# Patient Record
Sex: FEMALE | Race: WHITE | NOT HISPANIC OR LATINO | Employment: FULL TIME | URBAN - METROPOLITAN AREA
[De-identification: names, ages, dates, MRNs, and addresses within clinical notes are randomized per-mention and may not be internally consistent; named-entity substitution may affect disease eponyms.]

---

## 2023-06-14 ASSESSMENT — PROMIS GLOBAL HEALTH SCALE
CARRYOUT_SOCIAL_ACTIVITIES: EXCELLENT
CARRYOUT_PHYSICAL_ACTIVITIES: COMPLETELY
RATE_PHYSICAL_HEALTH: VERY GOOD
RATE_GENERAL_HEALTH: EXCELLENT
RATE_SOCIAL_SATISFACTION: EXCELLENT
RATE_MENTAL_HEALTH: VERY GOOD
RATE_QUALITY_OF_LIFE: EXCELLENT
RATE_AVERAGE_PAIN: 0
EMOTIONAL_PROBLEMS: SOMETIMES

## 2023-06-15 ENCOUNTER — OFFICE VISIT (OUTPATIENT)
Dept: PRIMARY CARE | Facility: CLINIC | Age: 28
End: 2023-06-15
Payer: COMMERCIAL

## 2023-06-15 VITALS
SYSTOLIC BLOOD PRESSURE: 128 MMHG | WEIGHT: 154 LBS | HEART RATE: 133 BPM | HEIGHT: 66 IN | BODY MASS INDEX: 24.75 KG/M2 | DIASTOLIC BLOOD PRESSURE: 84 MMHG

## 2023-06-15 DIAGNOSIS — Z23 NEED FOR TDAP VACCINATION: ICD-10-CM

## 2023-06-15 DIAGNOSIS — E04.1 THYROID NODULE: ICD-10-CM

## 2023-06-15 DIAGNOSIS — Z00.00 ENCOUNTER FOR PREVENTIVE HEALTH EXAMINATION: Primary | ICD-10-CM

## 2023-06-15 DIAGNOSIS — S43.431S GLENOID LABRAL TEAR, RIGHT, SEQUELA: ICD-10-CM

## 2023-06-15 PROBLEM — J30.1 ALLERGIC RHINITIS DUE TO POLLEN: Status: ACTIVE | Noted: 2023-06-15

## 2023-06-15 PROBLEM — L50.3 DERMATOGRAPHISM: Status: ACTIVE | Noted: 2023-06-15

## 2023-06-15 PROBLEM — H10.45 CHRONIC ALLERGIC CONJUNCTIVITIS: Status: ACTIVE | Noted: 2023-06-15

## 2023-06-15 PROBLEM — F41.9 ANXIETY: Status: ACTIVE | Noted: 2023-06-15

## 2023-06-15 PROBLEM — K58.9 IRRITABLE BOWEL SYNDROME (IBS): Status: ACTIVE | Noted: 2023-06-15

## 2023-06-15 PROCEDURE — 1036F TOBACCO NON-USER: CPT | Performed by: STUDENT IN AN ORGANIZED HEALTH CARE EDUCATION/TRAINING PROGRAM

## 2023-06-15 PROCEDURE — 99395 PREV VISIT EST AGE 18-39: CPT | Performed by: STUDENT IN AN ORGANIZED HEALTH CARE EDUCATION/TRAINING PROGRAM

## 2023-06-15 PROCEDURE — 99213 OFFICE O/P EST LOW 20 MIN: CPT | Performed by: STUDENT IN AN ORGANIZED HEALTH CARE EDUCATION/TRAINING PROGRAM

## 2023-06-15 PROCEDURE — 90715 TDAP VACCINE 7 YRS/> IM: CPT | Performed by: STUDENT IN AN ORGANIZED HEALTH CARE EDUCATION/TRAINING PROGRAM

## 2023-06-15 PROCEDURE — 90471 IMMUNIZATION ADMIN: CPT | Performed by: STUDENT IN AN ORGANIZED HEALTH CARE EDUCATION/TRAINING PROGRAM

## 2023-06-15 RX ORDER — CETIRIZINE HYDROCHLORIDE 10 MG/1
10 TABLET ORAL
COMMUNITY

## 2023-06-15 RX ORDER — LEVONORGESTREL AND ETHINYL ESTRADIOL 0.1-0.02MG
1 KIT ORAL
COMMUNITY
Start: 2020-10-06

## 2023-06-15 ASSESSMENT — PATIENT HEALTH QUESTIONNAIRE - PHQ9
2. FEELING DOWN, DEPRESSED OR HOPELESS: NOT AT ALL
1. LITTLE INTEREST OR PLEASURE IN DOING THINGS: NOT AT ALL
SUM OF ALL RESPONSES TO PHQ9 QUESTIONS 1 AND 2: 0

## 2023-06-15 NOTE — PROGRESS NOTES
Subjective   Patient ID: Melissa Brady is a 28 y.o. female who presents for Annual Exam.    HPI  Diet is well balanced.  Working on adding regular exercise to their routine.  PAP is up to date.  Vaccines are not up to date; they are due for: Tdap  Dental exam is up to date.  Vision exam is up to date.  Patient is not fasting for labs today.     Other concerns addressed today include: shoulder and thyroid  Patient would like an MRI ordered of her right shoulder.  She does have a history of a glenoid labral tear and she believes she may have torn it again in the last 6 months.  She has had swelling in the axilla area and she has had an orthopedic surgery referral for some time, but she is just been putting it off.  She really would like an MRI, but the previous provider she still would not order it for her.  She does have hypermobility of that shoulder and she believes she also had a rotator cuff injury in the past.  She reports no numbness, tingling or weakness of the right upper extremity.    She would like to get her thyroid function checked.  She was diagnosed with possible Hashimoto's a while back, but her thyroid function has been normal.  She would like to get this checked regularly and she would like to have that checked now.  She is getting  in October and will be stopping her birth control to try to start a family right away.    Review of Systems  All pertinent positive symptoms are included in the history of present illness.    All other systems have been reviewed and are negative and noncontributory to this patient's current ailments.     Social History     Tobacco Use   • Smoking status: Never   • Smokeless tobacco: Never   Substance Use Topics   • Alcohol use: Not on file      Past Surgical History:   Procedure Laterality Date   • SHOULDER SURGERY  12/29/2017    Shoulder Surgery      No Known Allergies     Current Outpatient Medications   Medication Sig Dispense Refill   • Larissia 0.1-20  "mg-mcg tablet Take 1 tablet by mouth once daily.     • cetirizine (ZyrTEC) 10 mg tablet Take 1 tablet (10 mg) by mouth once daily.       No current facility-administered medications for this visit.        Patient Active Problem List   Diagnosis   • Thyroid nodule   • Irritable bowel syndrome (IBS)   • Chronic allergic conjunctivitis   • Allergic rhinitis due to pollen   • Dermatographism   • Anxiety      Immunization History   Administered Date(s) Administered   • Moderna SARS-CoV-2 Vaccination 01/14/2021, 02/11/2021       Objective   VITALS  /84   Pulse (!) 133   Ht 1.676 m (5' 6\")   Wt 69.9 kg (154 lb)   BMI 24.86 kg/m²      Physical Exam  CONSTITUTIONAL - well nourished, well developed, looks like stated age, in no acute distress, not ill-appearing, and not tired appearing  SKIN - normal skin color and pigmentation, normal skin turgor without rash, lesions, or nodules visualized  HEAD - no trauma, normocephalic  EYES - pupils are equal and reactive to light, extraocular muscles are intact, and normal external exam  ENT - TM's intact, no injection, no signs of infection, uvula midline, normal tongue movement and throat normal, no exudate, nasal passage without discharge and patent  NECK - supple without rigidity, no neck mass was observed, no thyromegaly or thyroid nodules  CHEST - clear to auscultation, no wheezing, no crackles and no rales, good effort  CARDIAC - regular rate and regular rhythm, no skipped beats, no murmur  ABDOMEN - no organomegaly, soft, nontender, nondistended, normal bowel sounds, no guarding/rebound/rigidity, negative McBurney sign and negative Morelos sign  EXTREMITIES - no edema, no deformities  NEUROLOGICAL - normal gait, normal balance, normal motor, no ataxia, DTRs equal and symmetrical; alert, oriented and no focal signs  PSYCHIATRIC - alert, pleasant and cordial, age-appropriate  IMMUNOLOGIC - no cervical lymphadenopathy     Assessment/Plan   Diagnoses and all orders for " this visit:  Encounter for preventive health examination  A complete history and physical was performed today.  PAP is up to date.  Fasting labs ordered today include:  -     Hemoglobin A1C; Future  -     Lipid Panel; Future  -     Comprehensive Metabolic Panel; Future  -     CBC; Future  -     Hepatitis C Antibody; Future  Need for Tdap vaccination  -     Tdap vaccine, age 10 years and older  (BOOSTRIX)  Thyroid nodule  -     TSH with reflex to Free T4 if abnormal; Future  Glenoid labral tear, right, sequela  -     MR shoulder right wo IV contrast; Future

## 2023-07-12 ENCOUNTER — LAB (OUTPATIENT)
Dept: LAB | Facility: LAB | Age: 28
End: 2023-07-12
Payer: COMMERCIAL

## 2023-07-12 DIAGNOSIS — E04.1 THYROID NODULE: ICD-10-CM

## 2023-07-12 DIAGNOSIS — Z00.00 ENCOUNTER FOR PREVENTIVE HEALTH EXAMINATION: ICD-10-CM

## 2023-07-12 LAB
ALANINE AMINOTRANSFERASE (SGPT) (U/L) IN SER/PLAS: 26 U/L (ref 7–45)
ALBUMIN (G/DL) IN SER/PLAS: 4.3 G/DL (ref 3.4–5)
ALKALINE PHOSPHATASE (U/L) IN SER/PLAS: 56 U/L (ref 33–110)
ANION GAP IN SER/PLAS: 13 MMOL/L (ref 10–20)
ASPARTATE AMINOTRANSFERASE (SGOT) (U/L) IN SER/PLAS: 29 U/L (ref 9–39)
BILIRUBIN TOTAL (MG/DL) IN SER/PLAS: 0.6 MG/DL (ref 0–1.2)
CALCIUM (MG/DL) IN SER/PLAS: 9.2 MG/DL (ref 8.6–10.3)
CARBON DIOXIDE, TOTAL (MMOL/L) IN SER/PLAS: 28 MMOL/L (ref 21–32)
CHLORIDE (MMOL/L) IN SER/PLAS: 101 MMOL/L (ref 98–107)
CHOLESTEROL (MG/DL) IN SER/PLAS: 179 MG/DL (ref 0–199)
CHOLESTEROL IN HDL (MG/DL) IN SER/PLAS: 60.9 MG/DL
CHOLESTEROL/HDL RATIO: 2.9
CREATININE (MG/DL) IN SER/PLAS: 0.91 MG/DL (ref 0.5–1.05)
ERYTHROCYTE DISTRIBUTION WIDTH (RATIO) BY AUTOMATED COUNT: 13.8 % (ref 11.5–14.5)
ERYTHROCYTE MEAN CORPUSCULAR HEMOGLOBIN CONCENTRATION (G/DL) BY AUTOMATED: 31.6 G/DL (ref 32–36)
ERYTHROCYTE MEAN CORPUSCULAR VOLUME (FL) BY AUTOMATED COUNT: 87 FL (ref 80–100)
ERYTHROCYTES (10*6/UL) IN BLOOD BY AUTOMATED COUNT: 4.93 X10E12/L (ref 4–5.2)
ESTIMATED AVERAGE GLUCOSE FOR HBA1C: 103 MG/DL
GFR FEMALE: 88 ML/MIN/1.73M2
GLUCOSE (MG/DL) IN SER/PLAS: 79 MG/DL (ref 74–99)
HEMATOCRIT (%) IN BLOOD BY AUTOMATED COUNT: 43 % (ref 36–46)
HEMOGLOBIN (G/DL) IN BLOOD: 13.6 G/DL (ref 12–16)
HEMOGLOBIN A1C/HEMOGLOBIN TOTAL IN BLOOD: 5.2 %
HEPATITIS C VIRUS AB PRESENCE IN SERUM: NONREACTIVE
LDL: 93 MG/DL (ref 0–99)
LEUKOCYTES (10*3/UL) IN BLOOD BY AUTOMATED COUNT: 11.2 X10E9/L (ref 4.4–11.3)
PLATELETS (10*3/UL) IN BLOOD AUTOMATED COUNT: 394 X10E9/L (ref 150–450)
POTASSIUM (MMOL/L) IN SER/PLAS: 4 MMOL/L (ref 3.5–5.3)
PROTEIN TOTAL: 7.4 G/DL (ref 6.4–8.2)
SODIUM (MMOL/L) IN SER/PLAS: 138 MMOL/L (ref 136–145)
THYROTROPIN (MIU/L) IN SER/PLAS BY DETECTION LIMIT <= 0.05 MIU/L: 1.4 MIU/L (ref 0.44–3.98)
TRIGLYCERIDE (MG/DL) IN SER/PLAS: 126 MG/DL (ref 0–149)
UREA NITROGEN (MG/DL) IN SER/PLAS: 12 MG/DL (ref 6–23)
VLDL: 25 MG/DL (ref 0–40)

## 2023-07-12 PROCEDURE — 85027 COMPLETE CBC AUTOMATED: CPT

## 2023-07-12 PROCEDURE — 80053 COMPREHEN METABOLIC PANEL: CPT

## 2023-07-12 PROCEDURE — 36415 COLL VENOUS BLD VENIPUNCTURE: CPT

## 2023-07-12 PROCEDURE — 80061 LIPID PANEL: CPT

## 2023-07-12 PROCEDURE — 84443 ASSAY THYROID STIM HORMONE: CPT

## 2023-07-12 PROCEDURE — 86803 HEPATITIS C AB TEST: CPT

## 2023-07-12 PROCEDURE — 83036 HEMOGLOBIN GLYCOSYLATED A1C: CPT

## 2023-08-23 LAB — SARS-COV-2 RESULT: DETECTED

## 2023-09-11 ENCOUNTER — LAB (OUTPATIENT)
Dept: LAB | Facility: LAB | Age: 28
End: 2023-09-11
Payer: COMMERCIAL

## 2023-09-11 LAB — TOBACCO SCREEN, URINE: NEGATIVE

## 2023-09-26 PROBLEM — M25.511 PAIN IN JOINT OF RIGHT SHOULDER: Status: ACTIVE | Noted: 2023-09-26

## 2023-09-26 PROBLEM — S92.812A CLOSED FRACTURE OF SESAMOID BONE OF LEFT FOOT: Status: ACTIVE | Noted: 2017-09-29

## 2023-09-26 PROBLEM — M62.81 MUSCLE WEAKNESS OF RIGHT UPPER EXTREMITY: Status: ACTIVE | Noted: 2023-09-26

## 2023-09-26 PROBLEM — R22.1 LOCALIZED SWELLING, MASS AND LUMP, NECK: Status: ACTIVE | Noted: 2019-08-19

## 2023-09-26 PROBLEM — T50.Z95D ADVERSE EFFECT OF OTHER VACCINES AND BIOLOGICAL SUBSTANCES, SUBSEQUENT ENCOUNTER: Status: ACTIVE | Noted: 2023-09-26

## 2023-09-26 PROBLEM — R00.0 TACHYCARDIA: Status: ACTIVE | Noted: 2023-09-26

## 2023-09-26 PROBLEM — M79.672 PAIN IN LEFT FOOT: Status: ACTIVE | Noted: 2017-10-02

## 2023-09-26 RX ORDER — EPINEPHRINE 0.3 MG/.3ML
0.3 INJECTION SUBCUTANEOUS
COMMUNITY
Start: 2021-04-23

## 2023-09-26 RX ORDER — LORATADINE 10 MG/1
10 TABLET ORAL DAILY
COMMUNITY

## 2023-09-26 RX ORDER — AZELAIC ACID 0.15 G/G
GEL TOPICAL
COMMUNITY
Start: 2023-07-25

## 2023-10-02 ENCOUNTER — CLINICAL SUPPORT (OUTPATIENT)
Dept: ALLERGY | Facility: CLINIC | Age: 28
End: 2023-10-02
Payer: COMMERCIAL

## 2023-10-02 DIAGNOSIS — J30.89 NON-SEASONAL ALLERGIC RHINITIS, UNSPECIFIED TRIGGER: ICD-10-CM

## 2023-10-02 DIAGNOSIS — J30.81 ALLERGIC RHINITIS DUE TO ANIMAL HAIR AND DANDER: ICD-10-CM

## 2023-10-02 DIAGNOSIS — Z91.09 OTHER ALLERGY, OTHER THAN TO MEDICINAL AGENTS: ICD-10-CM

## 2023-10-02 DIAGNOSIS — J30.9 ALLERGIC RHINITIS, UNSPECIFIED SEASONALITY, UNSPECIFIED TRIGGER: ICD-10-CM

## 2023-10-02 PROCEDURE — 95117 IMMUNOTHERAPY INJECTIONS: CPT | Performed by: ALLERGY & IMMUNOLOGY

## 2023-10-09 ENCOUNTER — CLINICAL SUPPORT (OUTPATIENT)
Dept: ALLERGY | Facility: CLINIC | Age: 28
End: 2023-10-09
Payer: COMMERCIAL

## 2023-10-09 DIAGNOSIS — J30.89 NON-SEASONAL ALLERGIC RHINITIS, UNSPECIFIED TRIGGER: ICD-10-CM

## 2023-10-09 DIAGNOSIS — J30.9 ALLERGIC RHINITIS, UNSPECIFIED SEASONALITY, UNSPECIFIED TRIGGER: ICD-10-CM

## 2023-10-09 DIAGNOSIS — Z91.09 OTHER ALLERGY, OTHER THAN TO MEDICINAL AGENTS: ICD-10-CM

## 2023-10-09 DIAGNOSIS — J30.81 ALLERGIC RHINITIS DUE TO ANIMAL (CAT) (DOG) HAIR AND DANDER: ICD-10-CM

## 2023-10-09 PROCEDURE — 95117 IMMUNOTHERAPY INJECTIONS: CPT | Performed by: ALLERGY & IMMUNOLOGY

## 2023-10-16 DIAGNOSIS — T75.3XXD MOTION SICKNESS, SUBSEQUENT ENCOUNTER: Primary | ICD-10-CM

## 2023-10-16 RX ORDER — SCOLOPAMINE TRANSDERMAL SYSTEM 1 MG/1
1 PATCH, EXTENDED RELEASE TRANSDERMAL
Qty: 10 PATCH | Refills: 0 | Status: SHIPPED
Start: 2023-10-16 | End: 2023-12-06 | Stop reason: WASHOUT

## 2023-11-06 ENCOUNTER — CLINICAL SUPPORT (OUTPATIENT)
Dept: ALLERGY | Facility: CLINIC | Age: 28
End: 2023-11-06
Payer: COMMERCIAL

## 2023-11-06 DIAGNOSIS — J30.89 NON-SEASONAL ALLERGIC RHINITIS, UNSPECIFIED TRIGGER: ICD-10-CM

## 2023-11-06 DIAGNOSIS — J30.81 ALLERGIC RHINITIS DUE TO ANIMAL (CAT) (DOG) HAIR AND DANDER: ICD-10-CM

## 2023-11-06 DIAGNOSIS — J30.9 ALLERGIC RHINITIS, UNSPECIFIED SEASONALITY, UNSPECIFIED TRIGGER: ICD-10-CM

## 2023-11-06 PROCEDURE — 95117 IMMUNOTHERAPY INJECTIONS: CPT | Performed by: ALLERGY & IMMUNOLOGY

## 2023-11-13 ENCOUNTER — CLINICAL SUPPORT (OUTPATIENT)
Dept: ALLERGY | Facility: CLINIC | Age: 28
End: 2023-11-13
Payer: COMMERCIAL

## 2023-11-13 DIAGNOSIS — J30.89 NON-SEASONAL ALLERGIC RHINITIS, UNSPECIFIED TRIGGER: ICD-10-CM

## 2023-11-13 DIAGNOSIS — J30.81 ALLERGIC RHINITIS DUE TO ANIMAL (CAT) (DOG) HAIR AND DANDER: ICD-10-CM

## 2023-11-13 DIAGNOSIS — J30.9 ALLERGIC RHINITIS, UNSPECIFIED SEASONALITY, UNSPECIFIED TRIGGER: ICD-10-CM

## 2023-11-13 PROCEDURE — 95117 IMMUNOTHERAPY INJECTIONS: CPT | Performed by: ALLERGY & IMMUNOLOGY

## 2023-11-20 ENCOUNTER — CLINICAL SUPPORT (OUTPATIENT)
Dept: ALLERGY | Facility: CLINIC | Age: 28
End: 2023-11-20
Payer: COMMERCIAL

## 2023-11-20 DIAGNOSIS — J30.9 ALLERGIC RHINITIS, UNSPECIFIED SEASONALITY, UNSPECIFIED TRIGGER: ICD-10-CM

## 2023-11-20 DIAGNOSIS — J30.81 ALLERGIC RHINITIS DUE TO ANIMAL (CAT) (DOG) HAIR AND DANDER: ICD-10-CM

## 2023-11-20 DIAGNOSIS — J30.89 NON-SEASONAL ALLERGIC RHINITIS, UNSPECIFIED TRIGGER: ICD-10-CM

## 2023-11-20 PROCEDURE — 95117 IMMUNOTHERAPY INJECTIONS: CPT | Performed by: ALLERGY & IMMUNOLOGY

## 2023-11-27 ENCOUNTER — CLINICAL SUPPORT (OUTPATIENT)
Dept: ALLERGY | Facility: CLINIC | Age: 28
End: 2023-11-27
Payer: COMMERCIAL

## 2023-11-27 DIAGNOSIS — J30.81 ALLERGIC RHINITIS DUE TO ANIMAL (CAT) (DOG) HAIR AND DANDER: ICD-10-CM

## 2023-11-27 DIAGNOSIS — J30.9 ALLERGIC RHINITIS, UNSPECIFIED SEASONALITY, UNSPECIFIED TRIGGER: ICD-10-CM

## 2023-11-27 DIAGNOSIS — J30.89 NON-SEASONAL ALLERGIC RHINITIS, UNSPECIFIED TRIGGER: ICD-10-CM

## 2023-11-27 PROCEDURE — 95117 IMMUNOTHERAPY INJECTIONS: CPT | Performed by: ALLERGY & IMMUNOLOGY

## 2023-12-04 ENCOUNTER — CLINICAL SUPPORT (OUTPATIENT)
Dept: ALLERGY | Facility: CLINIC | Age: 28
End: 2023-12-04
Payer: COMMERCIAL

## 2023-12-04 DIAGNOSIS — J30.81 ALLERGIC RHINITIS DUE TO ANIMAL (CAT) (DOG) HAIR AND DANDER: ICD-10-CM

## 2023-12-04 DIAGNOSIS — J30.9 ALLERGIC RHINITIS, UNSPECIFIED SEASONALITY, UNSPECIFIED TRIGGER: ICD-10-CM

## 2023-12-04 DIAGNOSIS — J30.89 NON-SEASONAL ALLERGIC RHINITIS, UNSPECIFIED TRIGGER: ICD-10-CM

## 2023-12-04 PROCEDURE — 95117 IMMUNOTHERAPY INJECTIONS: CPT | Performed by: ALLERGY & IMMUNOLOGY

## 2023-12-06 ENCOUNTER — OFFICE VISIT (OUTPATIENT)
Dept: PRIMARY CARE | Facility: CLINIC | Age: 28
End: 2023-12-06
Payer: COMMERCIAL

## 2023-12-06 ENCOUNTER — LAB (OUTPATIENT)
Dept: LAB | Facility: LAB | Age: 28
End: 2023-12-06
Payer: COMMERCIAL

## 2023-12-06 VITALS
DIASTOLIC BLOOD PRESSURE: 84 MMHG | HEIGHT: 66 IN | WEIGHT: 151 LBS | SYSTOLIC BLOOD PRESSURE: 134 MMHG | HEART RATE: 145 BPM | BODY MASS INDEX: 24.27 KG/M2

## 2023-12-06 DIAGNOSIS — R00.0 TACHYCARDIA: ICD-10-CM

## 2023-12-06 DIAGNOSIS — H61.21 IMPACTED CERUMEN OF RIGHT EAR: ICD-10-CM

## 2023-12-06 DIAGNOSIS — F41.9 ANXIETY: Primary | ICD-10-CM

## 2023-12-06 LAB — TSH SERPL-ACNC: 0.92 MIU/L (ref 0.44–3.98)

## 2023-12-06 PROCEDURE — 84443 ASSAY THYROID STIM HORMONE: CPT

## 2023-12-06 PROCEDURE — 36415 COLL VENOUS BLD VENIPUNCTURE: CPT

## 2023-12-06 PROCEDURE — 99214 OFFICE O/P EST MOD 30 MIN: CPT | Performed by: STUDENT IN AN ORGANIZED HEALTH CARE EDUCATION/TRAINING PROGRAM

## 2023-12-06 PROCEDURE — 1036F TOBACCO NON-USER: CPT | Performed by: STUDENT IN AN ORGANIZED HEALTH CARE EDUCATION/TRAINING PROGRAM

## 2023-12-06 ASSESSMENT — PATIENT HEALTH QUESTIONNAIRE - PHQ9
SUM OF ALL RESPONSES TO PHQ9 QUESTIONS 1 AND 2: 0
1. LITTLE INTEREST OR PLEASURE IN DOING THINGS: NOT AT ALL
2. FEELING DOWN, DEPRESSED OR HOPELESS: NOT AT ALL

## 2023-12-06 NOTE — PROGRESS NOTES
"Subjective   Patient ID: Melissa Brady is a 28 y.o. female who presents for Panic Attack.  HPI  She is here for anxiety and panic attack which happens in the past 6 weeks. She experiences lightheaded, chest tightness, sweat during the attack. She stated an online therapy which she finds very helpful and convint with the work hours.  She is not interested in medication. Her heart was check with heart monitor and they clear her for heart disease. Her HR is normal high and would get to 130   She was diagnosed with Hashimoto disease before but did not have treatment for that.   She also is complaining of ear fullness without fever, running nose, ear pain, or discharge.   PMH allergies for which she gets injection monthly and the symptoms of allergies are controlled.   She works in the rehabilitation program at [employer]    Denies new onset headaches, chills, n/v/d, chest pain, SOB, abdominal pain, urinary symptoms, and lower extremity edema.     Review of Systems  All other systems have been reviewed and are negative.    Visit Vitals  /84   Pulse (!) 145   Ht 1.676 m (5' 6\")   Wt 68.5 kg (151 lb)   BMI 24.37 kg/m²   Smoking Status Never   BSA 1.79 m²       Objective   Physical Exam  General: Alert and oriented. Appears well-nourished and in no acute distress.  Eyes: PERRLA. EOMI.  Ear; clear external canal bilateral, unable to see TM due to wax  Head/neck: Normocephalic. Supple.  Lymphatics: No cervical lymphadenopathy.  Respiratory/Thorax: Clear to auscultation bilaterally. No wheezing.   Cardiovascular: Regular rate and rhythm. No murmurs.  Gastrointestinal: Soft, nontender, nondistended. +BS   Musculoskeletal: ROM intact. No joint swelling. Normal strength   Extremities: Warm and well perfused. No peripheral edema.  Neurological: No gross neurologic deficits.   Psychological: Appropriate mood and affect. Pleasant to talk, slightly anxious.   Skin: No visible rashes or lesions.     Assessment/Plan   She " is 28 year old female who complain of panic attack    # panic attack/tachycardia  -started 6 weeks ago (she got  at that time)  -exclude thyroid by ordering TSH with reflex T4  -last TSH was in July and was normal  -currently she is doing online therapy and she finds that helpful  -not interested in medication    # ear wax  -fullness of the right ear  -unable to see TM due to wax  -patient was advised to use Debrox for 5 days and come back for wax removal if she is unsuccessful at home  -No signs or symptoms of infection       No red flags. Follow up in  1 week for discussing TSH and wax impaction    Problem List Items Addressed This Visit       Anxiety - Primary    Tachycardia    Relevant Orders    TSH with reflex to Free T4 if abnormal     Other Visit Diagnoses       Impacted cerumen of right ear                I have personally reviewed all available pertinent labs, imaging, and consult notes with the patient.     All questions and concerns were addressed. Patient verbalizes understanding instructions and agrees with established plan of care.     I discussed the plan with Dr. Norris Pringle MD  Family medicine resident  PGY2

## 2023-12-11 PROCEDURE — 87624 HPV HI-RISK TYP POOLED RSLT: CPT

## 2023-12-11 PROCEDURE — 88175 CYTOPATH C/V AUTO FLUID REDO: CPT

## 2023-12-13 ENCOUNTER — LAB REQUISITION (OUTPATIENT)
Dept: LAB | Facility: HOSPITAL | Age: 28
End: 2023-12-13
Payer: COMMERCIAL

## 2023-12-13 DIAGNOSIS — R87.610 ATYPICAL SQUAMOUS CELLS OF UNDETERMINED SIGNIFICANCE ON CYTOLOGIC SMEAR OF CERVIX (ASC-US): ICD-10-CM

## 2023-12-13 DIAGNOSIS — R87.810 CERVICAL HIGH RISK HUMAN PAPILLOMAVIRUS (HPV) DNA TEST POSITIVE: ICD-10-CM

## 2023-12-13 DIAGNOSIS — Z87.42 PERSONAL HISTORY OF OTHER DISEASES OF THE FEMALE GENITAL TRACT: ICD-10-CM

## 2023-12-13 DIAGNOSIS — Z11.51 ENCOUNTER FOR SCREENING FOR HUMAN PAPILLOMAVIRUS (HPV): ICD-10-CM

## 2023-12-13 DIAGNOSIS — Z12.4 ENCOUNTER FOR SCREENING FOR MALIGNANT NEOPLASM OF CERVIX: ICD-10-CM

## 2023-12-15 ENCOUNTER — DOCUMENTATION (OUTPATIENT)
Dept: PHYSICAL THERAPY | Facility: CLINIC | Age: 28
End: 2023-12-15
Payer: COMMERCIAL

## 2023-12-15 NOTE — PROGRESS NOTES
Physical Therapy    Discharge Summary    Name: Melissa Brady  MRN: [id number]  : 1995  Date: 12/15/23    Discharge Summary: PT    Discharge Information: Date of discharge 12/15/2023, Date of last visit 2022, Date of evaluation 2022, Number of attended visits 2, Referred by Alejandra CAMPBELL, and Referred for acute pain of R shoulder    Therapy Summary: Pt attended PT to address acute right shoulder pain and fullness/swelling inferior angle region of right scapular, right scapular dyskinesia with shoulder and scapular instability, faulty postural alignment, impaired right SH rhythm with RUE motion, weakness in R shoulder and scapular stabilizers.    Discharge Status: Goals not met, unable to assess     Rehab Discharge Reason: Failed to schedule and/or keep follow-up appointment(s)

## 2023-12-18 ENCOUNTER — CLINICAL SUPPORT (OUTPATIENT)
Dept: ALLERGY | Facility: CLINIC | Age: 28
End: 2023-12-18
Payer: COMMERCIAL

## 2023-12-18 DIAGNOSIS — J30.81 ALLERGIC RHINITIS DUE TO ANIMAL (CAT) (DOG) HAIR AND DANDER: ICD-10-CM

## 2023-12-18 DIAGNOSIS — J30.89 NON-SEASONAL ALLERGIC RHINITIS, UNSPECIFIED TRIGGER: ICD-10-CM

## 2023-12-18 DIAGNOSIS — J30.9 ALLERGIC RHINITIS, UNSPECIFIED SEASONALITY, UNSPECIFIED TRIGGER: ICD-10-CM

## 2023-12-18 PROCEDURE — 95117 IMMUNOTHERAPY INJECTIONS: CPT | Performed by: ALLERGY & IMMUNOLOGY

## 2023-12-29 LAB
CYTOLOGY CMNT CVX/VAG CYTO-IMP: NORMAL
HPV HR 12 DNA GENITAL QL NAA+PROBE: POSITIVE
HPV HR GENOTYPES PNL CVX NAA+PROBE: POSITIVE
HPV16 DNA SPEC QL NAA+PROBE: NEGATIVE
HPV18 DNA SPEC QL NAA+PROBE: NEGATIVE
LAB AP HPV GENOTYPE QUESTION: YES
LAB AP HPV HR: NORMAL
LAB AP PREVIOUS ABNORMAL HISTORY: NORMAL
LABORATORY COMMENT REPORT: NORMAL
LMP START DATE: NORMAL
PATH REPORT.TOTAL CANCER: NORMAL

## 2024-01-08 ENCOUNTER — LAB REQUISITION (OUTPATIENT)
Dept: LAB | Facility: HOSPITAL | Age: 29
End: 2024-01-08
Payer: COMMERCIAL

## 2024-01-08 ENCOUNTER — CLINICAL SUPPORT (OUTPATIENT)
Dept: ALLERGY | Facility: CLINIC | Age: 29
End: 2024-01-08
Payer: COMMERCIAL

## 2024-01-08 DIAGNOSIS — J30.89 NON-SEASONAL ALLERGIC RHINITIS, UNSPECIFIED TRIGGER: ICD-10-CM

## 2024-01-08 DIAGNOSIS — J30.81 ALLERGIC RHINITIS DUE TO ANIMAL (CAT) (DOG) HAIR AND DANDER: ICD-10-CM

## 2024-01-08 DIAGNOSIS — R30.0 DYSURIA: ICD-10-CM

## 2024-01-08 DIAGNOSIS — J30.1 ALLERGIC RHINITIS DUE TO POLLEN, UNSPECIFIED SEASONALITY: ICD-10-CM

## 2024-01-08 PROCEDURE — 87086 URINE CULTURE/COLONY COUNT: CPT

## 2024-01-08 PROCEDURE — 95117 IMMUNOTHERAPY INJECTIONS: CPT | Performed by: ALLERGY & IMMUNOLOGY

## 2024-01-10 LAB — BACTERIA UR CULT: NO GROWTH

## 2024-01-15 ENCOUNTER — CLINICAL SUPPORT (OUTPATIENT)
Dept: ALLERGY | Facility: CLINIC | Age: 29
End: 2024-01-15
Payer: COMMERCIAL

## 2024-01-15 DIAGNOSIS — J30.89 NON-SEASONAL ALLERGIC RHINITIS, UNSPECIFIED TRIGGER: ICD-10-CM

## 2024-01-15 DIAGNOSIS — J30.1 ALLERGIC RHINITIS DUE TO POLLEN, UNSPECIFIED SEASONALITY: ICD-10-CM

## 2024-01-15 DIAGNOSIS — J30.81 ALLERGIC RHINITIS DUE TO ANIMAL (CAT) (DOG) HAIR AND DANDER: ICD-10-CM

## 2024-01-15 PROCEDURE — 95117 IMMUNOTHERAPY INJECTIONS: CPT | Performed by: ALLERGY & IMMUNOLOGY

## 2024-01-29 ENCOUNTER — CLINICAL SUPPORT (OUTPATIENT)
Dept: ALLERGY | Facility: CLINIC | Age: 29
End: 2024-01-29
Payer: COMMERCIAL

## 2024-01-29 DIAGNOSIS — J30.1 ALLERGIC RHINITIS DUE TO POLLEN, UNSPECIFIED SEASONALITY: ICD-10-CM

## 2024-01-29 DIAGNOSIS — J30.81 ALLERGIC RHINITIS DUE TO ANIMAL (CAT) (DOG) HAIR AND DANDER: ICD-10-CM

## 2024-01-29 DIAGNOSIS — J30.89 NON-SEASONAL ALLERGIC RHINITIS, UNSPECIFIED TRIGGER: ICD-10-CM

## 2024-01-29 PROCEDURE — 95117 IMMUNOTHERAPY INJECTIONS: CPT | Performed by: ALLERGY & IMMUNOLOGY

## 2024-02-08 ENCOUNTER — APPOINTMENT (OUTPATIENT)
Dept: PRIMARY CARE | Facility: CLINIC | Age: 29
End: 2024-02-08
Payer: COMMERCIAL

## 2024-02-19 ENCOUNTER — CLINICAL SUPPORT (OUTPATIENT)
Dept: ALLERGY | Facility: CLINIC | Age: 29
End: 2024-02-19
Payer: COMMERCIAL

## 2024-02-19 DIAGNOSIS — J30.89 NON-SEASONAL ALLERGIC RHINITIS, UNSPECIFIED TRIGGER: ICD-10-CM

## 2024-02-19 DIAGNOSIS — J30.1 ALLERGIC RHINITIS DUE TO POLLEN, UNSPECIFIED SEASONALITY: ICD-10-CM

## 2024-02-19 DIAGNOSIS — J30.81 ALLERGIC RHINITIS DUE TO ANIMAL (CAT) (DOG) HAIR AND DANDER: ICD-10-CM

## 2024-02-19 PROCEDURE — 95117 IMMUNOTHERAPY INJECTIONS: CPT | Performed by: ALLERGY & IMMUNOLOGY

## 2024-02-21 ENCOUNTER — LAB (OUTPATIENT)
Dept: LAB | Facility: LAB | Age: 29
End: 2024-02-21
Payer: COMMERCIAL

## 2024-02-21 DIAGNOSIS — N91.2 AMENORRHEA, UNSPECIFIED: Primary | ICD-10-CM

## 2024-02-21 LAB
B-HCG SERPL-ACNC: 62 MIU/ML
TSH SERPL-ACNC: 0.94 MIU/L (ref 0.44–3.98)

## 2024-02-21 PROCEDURE — 36415 COLL VENOUS BLD VENIPUNCTURE: CPT

## 2024-02-21 PROCEDURE — 84702 CHORIONIC GONADOTROPIN TEST: CPT

## 2024-02-21 PROCEDURE — 84443 ASSAY THYROID STIM HORMONE: CPT

## 2024-03-11 ENCOUNTER — APPOINTMENT (OUTPATIENT)
Dept: PRIMARY CARE | Facility: CLINIC | Age: 29
End: 2024-03-11
Payer: COMMERCIAL

## 2024-03-18 ENCOUNTER — APPOINTMENT (OUTPATIENT)
Dept: ALLERGY | Facility: CLINIC | Age: 29
End: 2024-03-18
Payer: COMMERCIAL

## 2024-03-20 ENCOUNTER — LAB REQUISITION (OUTPATIENT)
Dept: LAB | Facility: HOSPITAL | Age: 29
End: 2024-03-20
Payer: COMMERCIAL

## 2024-03-20 DIAGNOSIS — Z34.01 ENCOUNTER FOR SUPERVISION OF NORMAL FIRST PREGNANCY, FIRST TRIMESTER (HHS-HCC): ICD-10-CM

## 2024-03-20 DIAGNOSIS — Z11.3 ENCOUNTER FOR SCREENING FOR INFECTIONS WITH A PREDOMINANTLY SEXUAL MODE OF TRANSMISSION: ICD-10-CM

## 2024-03-20 PROCEDURE — 87591 N.GONORRHOEAE DNA AMP PROB: CPT

## 2024-03-20 PROCEDURE — 87800 DETECT AGNT MULT DNA DIREC: CPT

## 2024-03-20 PROCEDURE — 87491 CHLMYD TRACH DNA AMP PROBE: CPT

## 2024-03-21 LAB
C TRACH RRNA SPEC QL NAA+PROBE: NEGATIVE
N GONORRHOEA DNA SPEC QL PROBE+SIG AMP: NEGATIVE

## 2024-03-25 ENCOUNTER — OFFICE VISIT (OUTPATIENT)
Dept: ALLERGY | Facility: CLINIC | Age: 29
End: 2024-03-25
Payer: COMMERCIAL

## 2024-03-25 DIAGNOSIS — J30.89 ALLERGIC RHINITIS DUE TO OTHER ALLERGIC TRIGGER, UNSPECIFIED SEASONALITY: Primary | ICD-10-CM

## 2024-03-25 PROCEDURE — 95117 IMMUNOTHERAPY INJECTIONS: CPT | Performed by: ALLERGY & IMMUNOLOGY

## 2024-04-01 ENCOUNTER — PROCEDURE VISIT (OUTPATIENT)
Dept: PRIMARY CARE | Facility: CLINIC | Age: 29
End: 2024-04-01
Payer: COMMERCIAL

## 2024-04-01 ENCOUNTER — OFFICE VISIT (OUTPATIENT)
Dept: OTOLARYNGOLOGY | Facility: CLINIC | Age: 29
End: 2024-04-01
Payer: COMMERCIAL

## 2024-04-01 ENCOUNTER — CLINICAL SUPPORT (OUTPATIENT)
Dept: AUDIOLOGY | Facility: CLINIC | Age: 29
End: 2024-04-01
Payer: COMMERCIAL

## 2024-04-01 VITALS
BODY MASS INDEX: 24.91 KG/M2 | OXYGEN SATURATION: 97 % | HEART RATE: 71 BPM | WEIGHT: 155 LBS | TEMPERATURE: 97.7 F | HEIGHT: 66 IN | SYSTOLIC BLOOD PRESSURE: 130 MMHG | DIASTOLIC BLOOD PRESSURE: 80 MMHG

## 2024-04-01 VITALS — TEMPERATURE: 97.7 F | WEIGHT: 153 LBS | HEIGHT: 66 IN | BODY MASS INDEX: 24.59 KG/M2

## 2024-04-01 DIAGNOSIS — H90.3 SENSORINEURAL HEARING LOSS (SNHL) OF BOTH EARS: ICD-10-CM

## 2024-04-01 DIAGNOSIS — J30.9 ALLERGIC RHINITIS, UNSPECIFIED SEASONALITY, UNSPECIFIED TRIGGER: ICD-10-CM

## 2024-04-01 DIAGNOSIS — H90.3 SENSORINEURAL HEARING LOSS (SNHL) OF BOTH EARS: Primary | ICD-10-CM

## 2024-04-01 DIAGNOSIS — Z00.00 ROUTINE GENERAL MEDICAL EXAMINATION AT A HEALTH CARE FACILITY: Primary | ICD-10-CM

## 2024-04-01 DIAGNOSIS — H69.93 DYSFUNCTION OF BOTH EUSTACHIAN TUBES: Primary | ICD-10-CM

## 2024-04-01 DIAGNOSIS — Z3A.10 10 WEEKS GESTATION OF PREGNANCY (HHS-HCC): ICD-10-CM

## 2024-04-01 PROBLEM — T50.905A ADVERSE EFFECT OF BIOLOGICAL SUBSTANCE: Status: ACTIVE | Noted: 2023-09-26

## 2024-04-01 PROBLEM — N89.8 VAGINAL DISCHARGE: Status: ACTIVE | Noted: 2024-04-01

## 2024-04-01 PROBLEM — M25.519 ARTHRALGIA OF SHOULDER: Status: ACTIVE | Noted: 2023-09-26

## 2024-04-01 PROBLEM — M62.81 MUSCLE WEAKNESS OF UPPER EXTREMITY: Status: ACTIVE | Noted: 2022-12-20

## 2024-04-01 PROCEDURE — 92557 COMPREHENSIVE HEARING TEST: CPT | Performed by: AUDIOLOGIST

## 2024-04-01 PROCEDURE — 1036F TOBACCO NON-USER: CPT | Performed by: OTOLARYNGOLOGY

## 2024-04-01 PROCEDURE — 99395 PREV VISIT EST AGE 18-39: CPT | Performed by: FAMILY MEDICINE

## 2024-04-01 PROCEDURE — 99203 OFFICE O/P NEW LOW 30 MIN: CPT | Performed by: OTOLARYNGOLOGY

## 2024-04-01 PROCEDURE — 92550 TYMPANOMETRY & REFLEX THRESH: CPT | Performed by: AUDIOLOGIST

## 2024-04-01 RX ORDER — HYDROCORTISONE 25 MG/G
OINTMENT TOPICAL
COMMUNITY
Start: 2021-05-12

## 2024-04-01 RX ORDER — DOXYCYCLINE 100 MG/1
CAPSULE ORAL
COMMUNITY
Start: 2022-10-23

## 2024-04-01 RX ORDER — CLINDAMYCIN PHOSPHATE 10 MG/ML
SOLUTION TOPICAL
COMMUNITY
Start: 2022-03-02

## 2024-04-01 RX ORDER — TRIAMCINOLONE ACETONIDE 1 MG/G
CREAM TOPICAL
COMMUNITY
Start: 2022-03-01

## 2024-04-01 ASSESSMENT — ENCOUNTER SYMPTOMS
DIARRHEA: 0
UNEXPECTED WEIGHT CHANGE: 0
BLOOD IN STOOL: 0
EYE PAIN: 0
WEAKNESS: 0
SORE THROAT: 0
FEVER: 0
DECREASED CONCENTRATION: 0
CONFUSION: 0
SHORTNESS OF BREATH: 0
DIZZINESS: 0
FATIGUE: 0
PALPITATIONS: 0
HEMATURIA: 0
DYSURIA: 0
NUMBNESS: 0
VOMITING: 0
COUGH: 0
ABDOMINAL PAIN: 0
HALLUCINATIONS: 0
JOINT SWELLING: 0
NAUSEA: 0
FREQUENCY: 0
HEADACHES: 0
TROUBLE SWALLOWING: 0

## 2024-04-01 ASSESSMENT — PAIN SCALES - GENERAL: PAINLEVEL: 0-NO PAIN

## 2024-04-01 ASSESSMENT — PATIENT HEALTH QUESTIONNAIRE - PHQ9
1. LITTLE INTEREST OR PLEASURE IN DOING THINGS: NOT AT ALL
2. FEELING DOWN, DEPRESSED OR HOPELESS: NOT AT ALL
SUM OF ALL RESPONSES TO PHQ9 QUESTIONS 1 AND 2: 0

## 2024-04-01 NOTE — PROGRESS NOTES
Chief Complaint   Patient presents with    New Patient Visit     NP LEFT EAR POPPING, CLICKING FOR 7 YEARS, PRESSURE DURING ALLERGY SEASON, 10 WKS PREGNANT      Date of Evaluation: 4/1/2024   HPI  Melissa Garcia is a 29 y.o. female 10 weeks pregnant who complains of frequent popping in the left ear.  This has been a longstanding problem.  She does have allergy and is on immunotherapy and has used a variety of antihistamines.  She has stopped everything because of the pregnancy.  She has a history of noise exposure hunting with her father on a limited number of occasions.  There has been gun noise exposure on these limited occasions without ear protection.  Her audiogram today shows bilateral high-frequency sensorineural hearing loss with normal tympanometry.       Past Medical History:   Diagnosis Date    Other fracture of left foot, sequela 12/29/2017    Closed fracture of sesamoid bone of left foot, sequela    Other prurigo 06/04/2020    Pruritic rash    Other specified health status     Known health problems: none    Other specified joint disorders, unspecified joint 12/29/2017    Sesamoiditis    Pain in left foot 12/29/2017    Foot pain, left    Personal history of other diseases of the female genital tract 06/04/2021    History of vaginal discharge      Past Surgical History:   Procedure Laterality Date    SHOULDER SURGERY  12/29/2017    Shoulder Surgery          Medications:   Current Outpatient Medications   Medication Instructions    azelaic acid (Finacea) 15 % gel Topical    cetirizine (ZYRTEC) 10 mg, oral, Daily RT    clindamycin (Cleocin T) 1 % swab Clindamycin Phosphate 1 % External Swab Quantity: 60 Refills: 0 Ordered: 2-Mar-2022 DO Start : 2-Mar-2022 Active    doxycycline (Vibramycin) 100 mg capsule oral    EPINEPHrine 0.3 mg/0.3 mL injection syringe 0.3 mL, intramuscular, AS DIRECTED.    hydrocortisone 2.5 % ointment Hydrocortisone 2.5 % External Ointment APPLY AS DIRECTED TWICE A DAY EXTERNALLY  "TO FACE Quantity: 60 Refills: 0 Ordered: 12-May-2021 DO Start : 12-May-2021 Active    Larissia 0.1-20 mg-mcg tablet 1 tablet, oral, Daily RT    loratadine (CLARITIN) 10 mg, oral, Daily    multivitamin (MULTIPLE VITAMINS ORAL) oral    PNV Comb12/Iron Cb/FA1/DSS/DHA (PRENATAL 12-IRON-FA1-DSS-DHA ORAL) oral    triamcinolone (Kenalog) 0.1 % cream Triamcinolone Acetonide 0.1 % External Cream Quantity: 30 Refills: 0 Ordered: 1-Mar-2022 DO Start : 1-Mar-2022 Active        Allergies:  Allergies   Allergen Reactions    Adhesive Other     \"Intolerance    Latex Itching        Physical Exam:  Last Recorded Vitals  Temperature 36.5 °C (97.7 °F), height 1.676 m (5' 6\"), weight 69.4 kg (153 lb).  []General appearance: Well-developed, well-nourished in no acute distress, conversant with normal voice quality    Head/face: No erythema or edema or facial tenderness, and normal facial nerve function bilaterally    External ear: Clear external auditory canals with normal pinnae  Tube status: N/A  Middle ear: Tympanic membranes intact and mobile, middle ears normal.  Easily able to lateralize tympanic membrane with auto insufflation  Tympanic membrane perforation: N/A  Mastoid bowl: N/A  Hearing: Normal conversational awareness at normal speech thresholds    Nose visualized using: Anterior rhinoscopy  Nasal dorsum: Nontraumatic midline appearance  Septum: Midline, nonobstructing  Inferior turbinates: Normal, pink  Secretions: Dry    Oral cavity and oropharynx: Normal  Teeth: Good condition  Floor of mouth: without lesions  Palate: Normal hard palate, soft palate and uvula  Oropharynx: Clear, no lesions present  Buccal mucosa: Normal without masses or lesions  Lips: Normal    Nasopharynx: Inadequate mirror exam secondary to gag/anatomy    Neck:  Salivary glands: Normal bilateral parotid and submandibular glands by inspection and palpation.  Non-thyroid masses: No palpable masses or significant lymphadenopathy  Trachea: Midline  Thyroid: " No thyromegaly or palpable nodules  Temporomandibular joint: Nontender  Cervical range of motion: Normal    Neurologic exam: Alert and oriented x3, appropriate affect.  Cranial nerves II-XII normal bilaterally  Extraocular movement: Extraocular movement intact, normal gaze alignment        Melissa was seen today for new patient visit.  Diagnoses and all orders for this visit:  Dysfunction of both eustachian tubes (Primary)  Allergic rhinitis, unspecified seasonality, unspecified trigger  Sensorineural hearing loss (SNHL) of both ears       PLAN  I believe she has some mild intermittent eustachian tube dysfunction.  This is made worse with pregnancy hormones.  Unfortunately not much treatment at this time.  We did discuss noise exposure and I have recommended earplugs with any future noise exposure.  Repeat audiogram in 1 to 2 years.  Flonase when safe with her obstetrician    Sung Sky MD

## 2024-04-01 NOTE — PATIENT INSTRUCTIONS
It was nice to see you today!  Discussed current concerns and addressed   Reviewed recent labs and diagnostics  Reviewed medications list  Continue to eat a healthy diet, exercise at least 3 times a week or more  Plan and follow up discussed  For any further information related to your condition, copy and paste or go to [email or web address]  Follow up with OB

## 2024-04-08 ENCOUNTER — LAB (OUTPATIENT)
Dept: LAB | Facility: LAB | Age: 29
End: 2024-04-08
Payer: COMMERCIAL

## 2024-04-08 ENCOUNTER — APPOINTMENT (OUTPATIENT)
Dept: PRIMARY CARE | Facility: CLINIC | Age: 29
End: 2024-04-08
Payer: COMMERCIAL

## 2024-04-08 DIAGNOSIS — Z34.01 ENCOUNTER FOR SUPERVISION OF NORMAL FIRST PREGNANCY, FIRST TRIMESTER (HHS-HCC): Primary | ICD-10-CM

## 2024-04-08 LAB
ABO GROUP (TYPE) IN BLOOD: NORMAL
ANTIBODY SCREEN: NORMAL
ERYTHROCYTE [DISTWIDTH] IN BLOOD BY AUTOMATED COUNT: 13.6 % (ref 11.5–14.5)
EST. AVERAGE GLUCOSE BLD GHB EST-MCNC: 94 MG/DL
HBA1C MFR BLD: 4.9 %
HBV SURFACE AG SERPL QL IA: NONREACTIVE
HCT VFR BLD AUTO: 42.6 % (ref 36–46)
HCV AB SER QL: NONREACTIVE
HGB BLD-MCNC: 13.6 G/DL (ref 12–16)
HIV 1+2 AB+HIV1 P24 AG SERPL QL IA: NONREACTIVE
MCH RBC QN AUTO: 28.1 PG (ref 26–34)
MCHC RBC AUTO-ENTMCNC: 31.9 G/DL (ref 32–36)
MCV RBC AUTO: 88 FL (ref 80–100)
NRBC BLD-RTO: 0 /100 WBCS (ref 0–0)
PLATELET # BLD AUTO: 356 X10*3/UL (ref 150–450)
RBC # BLD AUTO: 4.84 X10*6/UL (ref 4–5.2)
RH FACTOR (ANTIGEN D): NORMAL
WBC # BLD AUTO: 11.8 X10*3/UL (ref 4.4–11.3)

## 2024-04-08 PROCEDURE — 86900 BLOOD TYPING SEROLOGIC ABO: CPT

## 2024-04-08 PROCEDURE — 86317 IMMUNOASSAY INFECTIOUS AGENT: CPT

## 2024-04-08 PROCEDURE — 86850 RBC ANTIBODY SCREEN: CPT

## 2024-04-08 PROCEDURE — 87340 HEPATITIS B SURFACE AG IA: CPT

## 2024-04-08 PROCEDURE — 86780 TREPONEMA PALLIDUM: CPT

## 2024-04-08 PROCEDURE — 36415 COLL VENOUS BLD VENIPUNCTURE: CPT

## 2024-04-08 PROCEDURE — 86901 BLOOD TYPING SEROLOGIC RH(D): CPT

## 2024-04-08 PROCEDURE — 85027 COMPLETE CBC AUTOMATED: CPT

## 2024-04-08 PROCEDURE — 87389 HIV-1 AG W/HIV-1&-2 AB AG IA: CPT

## 2024-04-08 PROCEDURE — 83036 HEMOGLOBIN GLYCOSYLATED A1C: CPT

## 2024-04-08 PROCEDURE — 86803 HEPATITIS C AB TEST: CPT

## 2024-04-08 PROCEDURE — 87086 URINE CULTURE/COLONY COUNT: CPT

## 2024-04-09 LAB
BACTERIA UR CULT: NORMAL
RUBV IGG SERPL IA-ACNC: 0.6 IA
RUBV IGG SERPL QL IA: NEGATIVE
TREPONEMA PALLIDUM IGG+IGM AB [PRESENCE] IN SERUM OR PLASMA BY IMMUNOASSAY: NONREACTIVE

## 2024-04-10 NOTE — PROGRESS NOTES
"  AUDIOLOGY ADULT AUDIOMETRIC EVALUATION    Name:  Melissa Garcia  :  1995  Age:  29 y.o.  Date of Evaluation:  2024    Reason for visit: Melissa is seen in the clinic today at the request of otolaryngology for an audiologic evaluation.     HISTORY  Patient reports she has history of allergies; eustachian tube dysfunction of left ear.   She has not been taking her allergy medications with being 10 weeks pregnant.   She has intermittent ringing; no dizziness.  There is history of noise exposure; hunting.    She does not notice any significant hearing loss.      EVALUATION  See scanned audiogram: “Media” > “Audiology Report”.      RESULTS  Otoscopic Evaluation:  Right Ear: clear ear canal  Left Ear: clear ear canal    Immittance Measures:  Tympanometry:  Right Ear: Type A, normal tympanic membrane mobility with normal middle ear pressure  Left Ear: Type A, normal tympanic membrane mobility with normal middle ear pressure    Acoustic Reflexes:  Ipsilateral Right Ear: Acoustic reflexes present within normal limits 500Hz through 4KHz   Ipsilateral Left Ear: Acoustic reflexes present within normal limits 500Hz through 4KHz   Contralateral Right Ear: did not evaluate  Contralateral Left Ear: did not evaluate    Audiometry:  Test Technique and Reliability:   Standard audiometry via supra-aural headphones; insert phones for higher frequencies.   Reliability is good.    Pure tone air and bone conduction audiometry:  Right Ear: Hearing sensitivity within normal limits 250Hz through 4KHz; mild sensorineural hearing loss at 6KHz through 8KHz  Left Ear: Borderline normal notch at 2KHz rising to normal at 3KHz-4KHz; mild to moderate sensorineural hearing loss 6KHz-8KHz     Speech Audiometry (Word Recognition Scores):   Right Ear: Excellent   Left Ear: Excellent     IMPRESSIONS    Mild high frequency sensorineural hearing loss 6KHz and above bilaterally; borderline normal \"notch\" at 2KHz left " ear.    RECOMMENDATIONS  - Follow up with otolaryngology today as scheduled.  - Annual audiologic evaluation, sooner if an acute change is noted.    PATIENT EDUCATION  Discussed results, impressions and recommendations with the patient. Questions were addressed and the patient was encouraged to contact our office should concerns arise.    Time for this encounter: 400/440    La Farias M.A., CCC/A   Licensed Audiologist

## 2024-04-18 LAB — SCAN RESULT: NORMAL

## 2024-04-23 LAB — SCAN RESULT: NORMAL

## 2024-04-29 ENCOUNTER — OFFICE VISIT (OUTPATIENT)
Dept: ALLERGY | Facility: CLINIC | Age: 29
End: 2024-04-29
Payer: COMMERCIAL

## 2024-04-29 DIAGNOSIS — J30.89 ALLERGIC RHINITIS DUE TO OTHER ALLERGIC TRIGGER, UNSPECIFIED SEASONALITY: Primary | ICD-10-CM

## 2024-04-29 PROCEDURE — 95117 IMMUNOTHERAPY INJECTIONS: CPT | Performed by: ALLERGY & IMMUNOLOGY

## 2024-05-11 NOTE — PROGRESS NOTES
Subjective   Patient ID:   [id number]   Melissa Garcia is a 29 y.o. female who presents for No chief complaint on file..    No chief complaint on file.         HPI  This patient is here to evaluate for:      Review of Systems      Objective     There were no vitals taken for this visit.     Physical Exam       Current Outpatient Medications   Medication Sig Dispense Refill    azelaic acid (Finacea) 15 % gel Apply topically.      cetirizine (ZyrTEC) 10 mg tablet Take 1 tablet (10 mg) by mouth once daily.      clindamycin (Cleocin T) 1 % swab Clindamycin Phosphate 1 % External Swab Quantity: 60 Refills: 0 Ordered: 2-Mar-2022 DO Start : 2-Mar-2022 Active      doxycycline (Vibramycin) 100 mg capsule Take by mouth.      EPINEPHrine 0.3 mg/0.3 mL injection syringe Inject 0.3 mL (0.3 mg) into the muscle. AS DIRECTED.      hydrocortisone 2.5 % ointment Hydrocortisone 2.5 % External Ointment APPLY AS DIRECTED TWICE A DAY EXTERNALLY TO FACE Quantity: 60 Refills: 0 Ordered: 12-May-2021 DO Start : 12-May-2021 Active      Larissia 0.1-20 mg-mcg tablet Take 1 tablet by mouth once daily.      loratadine (Claritin) 10 mg tablet Take 1 tablet (10 mg) by mouth once daily.      multivitamin (MULTIPLE VITAMINS ORAL) Take by mouth.      PNV Comb12/Iron Cb/FA1/DSS/DHA (PRENATAL 12-IRON-FA1-DSS-DHA ORAL) Take by mouth.      triamcinolone (Kenalog) 0.1 % cream Triamcinolone Acetonide 0.1 % External Cream Quantity: 30 Refills: 0 Ordered: 1-Mar-2022 DO Start : 1-Mar-2022 Active       No current facility-administered medications for this visit.       Summary of the labs over the past 6 months:    Lab Requisition on 03/20/2024   Component Date Value Ref Range Status    Neisseria gonorrhea,Amplified 03/20/2024 Negative  Negative Final    Chlamydia trachomatis, Amplified 03/20/2024 Negative  Negative Final   Lab on 02/21/2024   Component Date Value Ref Range Status    HCG, Beta-Quantitative 02/21/2024 62 (H)  <5 mIU/mL Final    Thyroid  Stimulating Hormone 02/21/2024 0.94  0.44 - 3.98 mIU/L Final   Lab Requisition on 01/08/2024   Component Date Value Ref Range Status    Urine Culture 01/08/2024 No growth   Final   Lab Requisition on 12/11/2023   Component Date Value Ref Range Status    Case Report 12/11/2023    Final                    Value:Gynecologic Cytology                              Case: [id number]                                   Authorizing Provider:  Natalya Rodrigues MD    Collected:           12/11/2023 0843              Ordering Location:     [facility]       Received:            12/13/2023 1315                                     Center                                                                       First Screen:          [place], [place]                                                                Rescreen:              KRISTIN Flores                                                              Specimen:    ThinPrep Liquid-Based Pap-Imaging System Screen, ECTO/ENDOCERVIX/VAGINA, SCREENING         Final Cytological Interpretation 12/11/2023    Final                    Value:This result contains rich text formatting which cannot be displayed here.      12/11/2023    Final                    Value:This result contains rich text formatting which cannot be displayed here.    ThinPrep Imaging System 12/11/2023    Final                    Value:This result contains rich text formatting which cannot be displayed here.    Educational Note 12/11/2023    Final                    Value:This result contains rich text formatting which cannot be displayed here.    Perform HPV HR test? 12/11/2023 Always (all interpretations)   Final    Include HPV Genotype? 12/11/2023 Yes   Final    LMP 12/11/2023 11/29/2023   Final    Previous abnormal cytology/biopsy 12/11/2023    Final                    Value:This result contains rich text formatting which cannot be displayed here.    HPV, high-risk 12/11/2023 Positive (A)  Negative Final     HPV Type 16 DNA 12/11/2023 Negative  Negative Final    HPV Type 18 DNA 12/11/2023 Negative  Negative Final    HPV non-Type 16 or 18 DNA 12/11/2023 Positive (A)  Negative Final   Lab on 12/06/2023   Component Date Value Ref Range Status    Thyroid Stimulating Hormone 12/06/2023 0.92  0.44 - 3.98 mIU/L Final         Assessment/Plan           Jose Saba MD

## 2024-05-28 NOTE — PROGRESS NOTES
See Allergy Shot Immunotherapy Record Book  Allergy checklist done, no concerns                  Subjective   Patient ID:   [id number]   Melissa Garcia is a 29 y.o. female who presents for No chief complaint on file..    No chief complaint on file.         HPI  This patient is here to evaluate for:      Review of Systems      Objective     LMP 01/25/2024      Physical Exam       Current Outpatient Medications   Medication Sig Dispense Refill    azelaic acid (Finacea) 15 % gel Apply topically.      cetirizine (ZyrTEC) 10 mg tablet Take 1 tablet (10 mg) by mouth once daily.      clindamycin (Cleocin T) 1 % swab Clindamycin Phosphate 1 % External Swab Quantity: 60 Refills: 0 Ordered: 2-Mar-2022 DO Start : 2-Mar-2022 Active      doxycycline (Vibramycin) 100 mg capsule Take by mouth.      EPINEPHrine 0.3 mg/0.3 mL injection syringe Inject 0.3 mL (0.3 mg) into the muscle. AS DIRECTED.      hydrocortisone 2.5 % ointment Hydrocortisone 2.5 % External Ointment APPLY AS DIRECTED TWICE A DAY EXTERNALLY TO FACE Quantity: 60 Refills: 0 Ordered: 12-May-2021 DO Start : 12-May-2021 Active      Larissia 0.1-20 mg-mcg tablet Take 1 tablet by mouth once daily.      loratadine (Claritin) 10 mg tablet Take 1 tablet (10 mg) by mouth once daily.      multivitamin (MULTIPLE VITAMINS ORAL) Take by mouth.      PNV Comb12/Iron Cb/FA1/DSS/DHA (PRENATAL 12-IRON-FA1-DSS-DHA ORAL) Take by mouth.      triamcinolone (Kenalog) 0.1 % cream Triamcinolone Acetonide 0.1 % External Cream Quantity: 30 Refills: 0 Ordered: 1-Mar-2022 DO Start : 1-Mar-2022 Active       No current facility-administered medications for this visit.       Summary of the labs over the past 6 months:    Lab on 04/08/2024   Component Date Value Ref Range Status    Scan Result 04/08/2024 Sent to Ordering Provider   Final    Scan Result 04/08/2024 Sent to Ordering Provider   Final    HIV 1/2 Antigen/Antibody Screen wi* 04/08/2024 Nonreactive  Nonreactive Final    WBC 04/08/2024  11.8 (H)  4.4 - 11.3 x10*3/uL Final    nRBC 04/08/2024 0.0  0.0 - 0.0 /100 WBCs Final    RBC 04/08/2024 4.84  4.00 - 5.20 x10*6/uL Final    Hemoglobin 04/08/2024 13.6  12.0 - 16.0 g/dL Final    Hematocrit 04/08/2024 42.6  36.0 - 46.0 % Final    MCV 04/08/2024 88  80 - 100 fL Final    MCH 04/08/2024 28.1  26.0 - 34.0 pg Final    MCHC 04/08/2024 31.9 (L)  32.0 - 36.0 g/dL Final    RDW 04/08/2024 13.6  11.5 - 14.5 % Final    Platelets 04/08/2024 356  150 - 450 x10*3/uL Final    Hepatitis C AB 04/08/2024 Nonreactive  Nonreactive Final    Urine Culture 04/08/2024 No significant growth   Final    Syphilis Total Ab 04/08/2024 Nonreactive  Nonreactive Final    Hemoglobin A1C 04/08/2024 4.9  see below % Final    Estimated Average Glucose 04/08/2024 94  Not Established mg/dL Final    Rubella, IgG 04/08/2024 Negative  Negative Final    Rubella, IgG Index 04/08/2024 0.6  <=0.7 IA IA Final    ABO TYPE 04/08/2024 B   Final    Rh TYPE 04/08/2024 POS   Final    ANTIBODY SCREEN 04/08/2024 NEG   Final    Hepatitis B Surface AG 04/08/2024 Nonreactive  Nonreactive Final   Lab Requisition on 03/20/2024   Component Date Value Ref Range Status    Neisseria gonorrhea,Amplified 03/20/2024 Negative  Negative Final    Chlamydia trachomatis, Amplified 03/20/2024 Negative  Negative Final   Lab on 02/21/2024   Component Date Value Ref Range Status    HCG, Beta-Quantitative 02/21/2024 62 (H)  <5 mIU/mL Final    Thyroid Stimulating Hormone 02/21/2024 0.94  0.44 - 3.98 mIU/L Final   Lab Requisition on 01/08/2024   Component Date Value Ref Range Status    Urine Culture 01/08/2024 No growth   Final   Lab Requisition on 12/11/2023   Component Date Value Ref Range Status    Case Report 12/11/2023    Final                    Value:Gynecologic Cytology                              Case: [id number]                                   Authorizing Provider:  Natalya Rodrigues MD    Collected:           12/11/2023 0843              Ordering Location:       [facility]       Received:            12/13/2023 1315                                     Center                                                                       First Screen:          [place], [place]                                                                Rescreen:              Margarita Angel, [place]                                                              Specimen:    ThinPrep Liquid-Based Pap-Imaging System Screen, ECTO/ENDOCERVIX/VAGINA, SCREENING         Final Cytological Interpretation 12/11/2023    Final                    Value:This result contains rich text formatting which cannot be displayed here.      12/11/2023    Final                    Value:This result contains rich text formatting which cannot be displayed here.    ThinPrep Imaging System 12/11/2023    Final                    Value:This result contains rich text formatting which cannot be displayed here.    Educational Note 12/11/2023    Final                    Value:This result contains rich text formatting which cannot be displayed here.    Perform HPV HR test? 12/11/2023 Always (all interpretations)   Final    Include HPV Genotype? 12/11/2023 Yes   Final    LMP 12/11/2023 11/29/2023   Final    Previous abnormal cytology/biopsy 12/11/2023    Final                    Value:This result contains rich text formatting which cannot be displayed here.    HPV, high-risk 12/11/2023 Positive (A)  Negative Final    HPV Type 16 DNA 12/11/2023 Negative  Negative Final    HPV Type 18 DNA 12/11/2023 Negative  Negative Final    HPV non-Type 16 or 18 DNA 12/11/2023 Positive (A)  Negative Final   Lab on 12/06/2023   Component Date Value Ref Range Status    Thyroid Stimulating Hormone 12/06/2023 0.92  0.44 - 3.98 mIU/L Final         Assessment/Plan           Jose Saba MD

## 2024-06-03 ENCOUNTER — APPOINTMENT (OUTPATIENT)
Dept: ALLERGY | Facility: CLINIC | Age: 29
End: 2024-06-03
Payer: COMMERCIAL

## 2024-06-03 DIAGNOSIS — J30.89 ALLERGIC RHINITIS DUE TO OTHER ALLERGIC TRIGGER, UNSPECIFIED SEASONALITY: Primary | ICD-10-CM

## 2024-06-03 PROCEDURE — 95117 IMMUNOTHERAPY INJECTIONS: CPT | Performed by: ALLERGY & IMMUNOLOGY

## 2024-06-10 ENCOUNTER — APPOINTMENT (OUTPATIENT)
Dept: DERMATOLOGY | Facility: CLINIC | Age: 29
End: 2024-06-10
Payer: COMMERCIAL

## 2024-06-29 NOTE — PROGRESS NOTES
See Allergy Shot Immunotherapy Record Book  Allergy checklist done, no concerns                  Subjective   Patient ID:   [id number]   Melissa Garcia is a 29 y.o. female who presents for No chief complaint on file..    No chief complaint on file.         HPI  This patient is here to evaluate for:      Review of Systems      Objective     LMP 01/25/2024      Physical Exam       Current Outpatient Medications   Medication Sig Dispense Refill    azelaic acid (Finacea) 15 % gel Apply topically.      cetirizine (ZyrTEC) 10 mg tablet Take 1 tablet (10 mg) by mouth once daily.      clindamycin (Cleocin T) 1 % swab Clindamycin Phosphate 1 % External Swab Quantity: 60 Refills: 0 Ordered: 2-Mar-2022 DO Start : 2-Mar-2022 Active      doxycycline (Vibramycin) 100 mg capsule Take by mouth.      EPINEPHrine 0.3 mg/0.3 mL injection syringe Inject 0.3 mL (0.3 mg) into the muscle. AS DIRECTED.      hydrocortisone 2.5 % ointment Hydrocortisone 2.5 % External Ointment APPLY AS DIRECTED TWICE A DAY EXTERNALLY TO FACE Quantity: 60 Refills: 0 Ordered: 12-May-2021 DO Start : 12-May-2021 Active      Larissia 0.1-20 mg-mcg tablet Take 1 tablet by mouth once daily.      loratadine (Claritin) 10 mg tablet Take 1 tablet (10 mg) by mouth once daily.      multivitamin (MULTIPLE VITAMINS ORAL) Take by mouth.      PNV Comb12/Iron Cb/FA1/DSS/DHA (PRENATAL 12-IRON-FA1-DSS-DHA ORAL) Take by mouth.      triamcinolone (Kenalog) 0.1 % cream Triamcinolone Acetonide 0.1 % External Cream Quantity: 30 Refills: 0 Ordered: 1-Mar-2022 DO Start : 1-Mar-2022 Active       No current facility-administered medications for this visit.       Summary of the labs over the past 6 months:    Lab on 04/08/2024   Component Date Value Ref Range Status    Scan Result 04/08/2024 Sent to Ordering Provider   Final    Scan Result 04/08/2024 Sent to Ordering Provider   Final    HIV 1/2 Antigen/Antibody Screen wi* 04/08/2024 Nonreactive  Nonreactive Final    WBC 04/08/2024  11.8 (H)  4.4 - 11.3 x10*3/uL Final    nRBC 04/08/2024 0.0  0.0 - 0.0 /100 WBCs Final    RBC 04/08/2024 4.84  4.00 - 5.20 x10*6/uL Final    Hemoglobin 04/08/2024 13.6  12.0 - 16.0 g/dL Final    Hematocrit 04/08/2024 42.6  36.0 - 46.0 % Final    MCV 04/08/2024 88  80 - 100 fL Final    MCH 04/08/2024 28.1  26.0 - 34.0 pg Final    MCHC 04/08/2024 31.9 (L)  32.0 - 36.0 g/dL Final    RDW 04/08/2024 13.6  11.5 - 14.5 % Final    Platelets 04/08/2024 356  150 - 450 x10*3/uL Final    Hepatitis C AB 04/08/2024 Nonreactive  Nonreactive Final    Urine Culture 04/08/2024 No significant growth   Final    Syphilis Total Ab 04/08/2024 Nonreactive  Nonreactive Final    Hemoglobin A1C 04/08/2024 4.9  see below % Final    Estimated Average Glucose 04/08/2024 94  Not Established mg/dL Final    Rubella, IgG 04/08/2024 Negative  Negative Final    Rubella, IgG Index 04/08/2024 0.6  <=0.7 IA IA Final    ABO TYPE 04/08/2024 B   Final    Rh TYPE 04/08/2024 POS   Final    ANTIBODY SCREEN 04/08/2024 NEG   Final    Hepatitis B Surface AG 04/08/2024 Nonreactive  Nonreactive Final   Lab Requisition on 03/20/2024   Component Date Value Ref Range Status    Neisseria gonorrhea,Amplified 03/20/2024 Negative  Negative Final    Chlamydia trachomatis, Amplified 03/20/2024 Negative  Negative Final   Lab on 02/21/2024   Component Date Value Ref Range Status    HCG, Beta-Quantitative 02/21/2024 62 (H)  <5 mIU/mL Final    Thyroid Stimulating Hormone 02/21/2024 0.94  0.44 - 3.98 mIU/L Final   Lab Requisition on 01/08/2024   Component Date Value Ref Range Status    Urine Culture 01/08/2024 No growth   Final   Lab Requisition on 12/11/2023   Component Date Value Ref Range Status    Case Report 12/11/2023    Final                    Value:Gynecologic Cytology                              Case: [id number]                                   Authorizing Provider:  Natalya Rodrigues MD    Collected:           12/11/2023 0843              Ordering Location:       [facility]       Received:            12/13/2023 1315                                     Center                                                                       First Screen:          [place], [place]                                                                Rescreen:              Margarita Angel, [place]                                                              Specimen:    ThinPrep Liquid-Based Pap-Imaging System Screen, ECTO/ENDOCERVIX/VAGINA, SCREENING         Final Cytological Interpretation 12/11/2023    Final                    Value:This result contains rich text formatting which cannot be displayed here.      12/11/2023    Final                    Value:This result contains rich text formatting which cannot be displayed here.    ThinPrep Imaging System 12/11/2023    Final                    Value:This result contains rich text formatting which cannot be displayed here.    Educational Note 12/11/2023    Final                    Value:This result contains rich text formatting which cannot be displayed here.    Perform HPV HR test? 12/11/2023 Always (all interpretations)   Final    Include HPV Genotype? 12/11/2023 Yes   Final    LMP 12/11/2023 11/29/2023   Final    Previous abnormal cytology/biopsy 12/11/2023    Final                    Value:This result contains rich text formatting which cannot be displayed here.    HPV, high-risk 12/11/2023 Positive (A)  Negative Final    HPV Type 16 DNA 12/11/2023 Negative  Negative Final    HPV Type 18 DNA 12/11/2023 Negative  Negative Final    HPV non-Type 16 or 18 DNA 12/11/2023 Positive (A)  Negative Final   Lab on 12/06/2023   Component Date Value Ref Range Status    Thyroid Stimulating Hormone 12/06/2023 0.92  0.44 - 3.98 mIU/L Final         Assessment/Plan           Jose Saba MD

## 2024-07-01 ENCOUNTER — APPOINTMENT (OUTPATIENT)
Dept: ALLERGY | Facility: CLINIC | Age: 29
End: 2024-07-01
Payer: COMMERCIAL

## 2024-07-01 DIAGNOSIS — J30.89 ALLERGIC RHINITIS DUE TO OTHER ALLERGIC TRIGGER, UNSPECIFIED SEASONALITY: Primary | ICD-10-CM

## 2024-07-01 PROCEDURE — 95117 IMMUNOTHERAPY INJECTIONS: CPT | Performed by: ALLERGY & IMMUNOLOGY

## 2024-07-15 ENCOUNTER — APPOINTMENT (OUTPATIENT)
Dept: ALLERGY | Facility: CLINIC | Age: 29
End: 2024-07-15
Payer: COMMERCIAL

## 2024-07-15 ENCOUNTER — LAB (OUTPATIENT)
Dept: LAB | Facility: LAB | Age: 29
End: 2024-07-15
Payer: COMMERCIAL

## 2024-07-15 DIAGNOSIS — Z34.02 ENCOUNTER FOR SUPERVISION OF NORMAL FIRST PREGNANCY, SECOND TRIMESTER (HHS-HCC): Primary | ICD-10-CM

## 2024-07-15 LAB
ERYTHROCYTE [DISTWIDTH] IN BLOOD BY AUTOMATED COUNT: 13.6 % (ref 11.5–14.5)
GLUCOSE 1H P 50 G GLC PO SERPL-MCNC: 81 MG/DL
HCT VFR BLD AUTO: 38.8 % (ref 36–46)
HGB BLD-MCNC: 12.2 G/DL (ref 12–16)
MCH RBC QN AUTO: 28 PG (ref 26–34)
MCHC RBC AUTO-ENTMCNC: 31.4 G/DL (ref 32–36)
MCV RBC AUTO: 89 FL (ref 80–100)
NRBC BLD-RTO: 0 /100 WBCS (ref 0–0)
PLATELET # BLD AUTO: 308 X10*3/UL (ref 150–450)
RBC # BLD AUTO: 4.36 X10*6/UL (ref 4–5.2)
TSH SERPL-ACNC: 1.07 MIU/L (ref 0.44–3.98)
WBC # BLD AUTO: 13.3 X10*3/UL (ref 4.4–11.3)

## 2024-07-15 PROCEDURE — 85027 COMPLETE CBC AUTOMATED: CPT

## 2024-07-15 PROCEDURE — 84443 ASSAY THYROID STIM HORMONE: CPT

## 2024-07-15 PROCEDURE — 82947 ASSAY GLUCOSE BLOOD QUANT: CPT

## 2024-07-15 PROCEDURE — 36415 COLL VENOUS BLD VENIPUNCTURE: CPT

## 2024-07-29 ENCOUNTER — HOSPITAL ENCOUNTER (EMERGENCY)
Facility: HOSPITAL | Age: 29
Discharge: HOME | End: 2024-07-29
Payer: COMMERCIAL

## 2024-07-29 ENCOUNTER — APPOINTMENT (OUTPATIENT)
Dept: ALLERGY | Facility: CLINIC | Age: 29
End: 2024-07-29
Payer: COMMERCIAL

## 2024-07-29 ENCOUNTER — APPOINTMENT (OUTPATIENT)
Dept: RADIOLOGY | Facility: HOSPITAL | Age: 29
End: 2024-07-29
Payer: COMMERCIAL

## 2024-07-29 VITALS
BODY MASS INDEX: 26.79 KG/M2 | OXYGEN SATURATION: 98 % | HEART RATE: 90 BPM | DIASTOLIC BLOOD PRESSURE: 93 MMHG | HEIGHT: 66 IN | WEIGHT: 166.67 LBS | RESPIRATION RATE: 18 BRPM | TEMPERATURE: 98.9 F | SYSTOLIC BLOOD PRESSURE: 159 MMHG

## 2024-07-29 DIAGNOSIS — J30.89 ALLERGIC RHINITIS DUE TO OTHER ALLERGIC TRIGGER, UNSPECIFIED SEASONALITY: ICD-10-CM

## 2024-07-29 DIAGNOSIS — T78.40XA ALLERGIC REACTION, INITIAL ENCOUNTER: Primary | ICD-10-CM

## 2024-07-29 DIAGNOSIS — J30.1 ALLERGIC RHINITIS DUE TO POLLEN, UNSPECIFIED SEASONALITY: Primary | ICD-10-CM

## 2024-07-29 PROCEDURE — 2500000004 HC RX 250 GENERAL PHARMACY W/ HCPCS (ALT 636 FOR OP/ED)

## 2024-07-29 PROCEDURE — 96372 THER/PROPH/DIAG INJ SC/IM: CPT

## 2024-07-29 PROCEDURE — 76815 OB US LIMITED FETUS(S): CPT

## 2024-07-29 PROCEDURE — 95117 IMMUNOTHERAPY INJECTIONS: CPT | Performed by: ALLERGY & IMMUNOLOGY

## 2024-07-29 PROCEDURE — 76815 OB US LIMITED FETUS(S): CPT | Performed by: RADIOLOGY

## 2024-07-29 PROCEDURE — 2500000001 HC RX 250 WO HCPCS SELF ADMINISTERED DRUGS (ALT 637 FOR MEDICARE OP)

## 2024-07-29 PROCEDURE — 99284 EMERGENCY DEPT VISIT MOD MDM: CPT

## 2024-07-29 RX ORDER — DIPHENHYDRAMINE HCL 25 MG
25 TABLET ORAL ONCE
Status: COMPLETED | OUTPATIENT
Start: 2024-07-29 | End: 2024-07-29

## 2024-07-29 RX ORDER — METHYLPREDNISOLONE 4 MG/1
TABLET ORAL
Qty: 21 TABLET | Refills: 0 | Status: SHIPPED | OUTPATIENT
Start: 2024-07-29 | End: 2024-08-05

## 2024-07-29 RX ORDER — FAMOTIDINE 20 MG/1
20 TABLET, FILM COATED ORAL ONCE
Status: COMPLETED | OUTPATIENT
Start: 2024-07-29 | End: 2024-07-29

## 2024-07-29 ASSESSMENT — PAIN - FUNCTIONAL ASSESSMENT: PAIN_FUNCTIONAL_ASSESSMENT: 0-10

## 2024-07-29 ASSESSMENT — COLUMBIA-SUICIDE SEVERITY RATING SCALE - C-SSRS
2. HAVE YOU ACTUALLY HAD ANY THOUGHTS OF KILLING YOURSELF?: NO
1. IN THE PAST MONTH, HAVE YOU WISHED YOU WERE DEAD OR WISHED YOU COULD GO TO SLEEP AND NOT WAKE UP?: NO
6. HAVE YOU EVER DONE ANYTHING, STARTED TO DO ANYTHING, OR PREPARED TO DO ANYTHING TO END YOUR LIFE?: NO

## 2024-07-29 ASSESSMENT — PAIN SCALES - GENERAL: PAINLEVEL_OUTOF10: 4

## 2024-07-29 ASSESSMENT — PAIN DESCRIPTION - LOCATION: LOCATION: THROAT

## 2024-07-29 ASSESSMENT — PAIN DESCRIPTION - FREQUENCY: FREQUENCY: CONSTANT/CONTINUOUS

## 2024-07-29 ASSESSMENT — PAIN DESCRIPTION - DESCRIPTORS: DESCRIPTORS: TIGHTNESS

## 2024-07-29 ASSESSMENT — PAIN DESCRIPTION - PAIN TYPE: TYPE: ACUTE PAIN

## 2024-07-29 ASSESSMENT — PAIN DESCRIPTION - ONSET: ONSET: SUDDEN

## 2024-07-29 ASSESSMENT — PAIN DESCRIPTION - PROGRESSION: CLINICAL_PROGRESSION: NOT CHANGED

## 2024-07-29 NOTE — PROGRESS NOTES
"Pharmacy Medication History Review    Melissa Garcia is a 29 y.o. female admitted for Allergic Reaction. Pharmacy reviewed the patient's [id number] medications and allergies for accuracy.    The list below reflectives the updated PTA list. Please review each medication in order reconciliation for additional clarification and justification.  Prior to Admission Medications   Prescriptions Last Dose Informant Patient Reported? Taking?   EPINEPHrine 0.3 mg/0.3 mL injection syringe   Yes No   Sig: Inject 0.3 mL (0.3 mg) into the muscle. AS DIRECTED.   PNV Comb12/Iron Cb/FA1/DSS/DHA (PRENATAL 12-IRON-FA1-DSS-DHA ORAL) 7/28/2024 at pm  Yes Yes   Sig: Take 1 tablet by mouth once daily.   loratadine (Claritin) 10 mg tablet 7/29/2024 at am  Yes Yes   Sig: Take 1 tablet (10 mg) by mouth once daily.      Facility-Administered Medications: None          The list below reflectives the updated allergy list. Please review each documented allergy for additional clarification and justification.  Allergies  Reviewed by Annamaria Dao CPhT on 7/29/2024        Severity Reactions Comments    Adhesive Low Other \"Intolerance    Latex Low Itching             Below are additional concerns with the patient's PTA list.  - pt stated she received an immunotherapy therapy shot this morning and is having a reaction to the injection.      ANNAMARIA DAO CPhT    "

## 2024-07-29 NOTE — ED PROVIDER NOTES
HPI   Chief Complaint   Patient presents with    Allergic Reaction     I get immune therapy and I swell up every time I get the shot  but today I took the shot at 0900 and not only did I get swelling at the injection site I also have  itching and throat swelling it feels tight       Patient is a 29-year-old female who presents emergency department for evaluation of possible allergic reaction.  Patient is currently 26 weeks pregnant and gets monthly immunotherapy shots.  She states that after immunotherapy shot she typically gets somewhat itchy, but states that this time around she had widespread itching to her chest, neck, and arms.  She states that she felt tingling in her throat and lips.  She became concerned and presents for further evaluation.  She feels like her heart is racing.  She denies any difficulty swallowing and denies any difficulty breathing.  She is relatively healthy individual with no other chronic medical problems.  She states that she gets the immunotherapy shot just for seasonal allergies.  She denies any nausea, vomiting, fevers, chills lightheadedness, dizziness.      History provided by:  Patient   used: No            Patient History   Past Medical History:   Diagnosis Date    Other fracture of left foot, sequela 12/29/2017    Closed fracture of sesamoid bone of left foot, sequela    Other prurigo 06/04/2020    Pruritic rash    Other specified health status     Known health problems: none    Other specified joint disorders, unspecified joint 12/29/2017    Sesamoiditis    Pain in left foot 12/29/2017    Foot pain, left    Personal history of other diseases of the female genital tract 06/04/2021    History of vaginal discharge     Past Surgical History:   Procedure Laterality Date    SHOULDER SURGERY  12/29/2017    Shoulder Surgery     Family History   Problem Relation Name Age of Onset    No Known Problems Mother      Rheum arthritis Maternal Grandmother      Other  (systemic lupus erythematosus) Maternal Grandmother      Thyroid cancer Paternal Grandmother      Thyroid disease Other maternal aunt      Social History     Tobacco Use    Smoking status: Never    Smokeless tobacco: Never   Substance Use Topics    Alcohol use: Not on file    Drug use: Not on file       Physical Exam   ED Triage Vitals [07/29/24 1116]   Temperature Heart Rate Respirations BP   37.2 °C (98.9 °F) (!) 110 18 (!) 159/93      Pulse Ox Temp Source Heart Rate Source Patient Position   98 % Temporal Monitor Sitting      BP Location FiO2 (%)     Left arm --       Physical Exam  Constitutional:       Appearance: Normal appearance.   HENT:      Head: Normocephalic and atraumatic.      Mouth/Throat:      Mouth: Mucous membranes are moist.      Comments: No pharyngeal swelling, no tonsillar swelling, no tongue swelling, no perioral swelling.  Cardiovascular:      Rate and Rhythm: Regular rhythm. Tachycardia present.   Pulmonary:      Effort: Pulmonary effort is normal.      Breath sounds: Normal breath sounds.   Musculoskeletal:         General: Normal range of motion.      Cervical back: Normal range of motion.   Skin:     General: Skin is warm and dry.      Comments: No widespread urticarial rash or lesions.   Neurological:      General: No focal deficit present.      Mental Status: She is alert and oriented to person, place, and time.   Psychiatric:      Comments: Anxious appearing.           ED Course & MDM   Diagnoses as of 07/29/24 2050   Allergic reaction, initial encounter                       [place] Coma Scale Score: 15                        Medical Decision Making  Patient is a 29-year-old female presents emergency department for evaluation of possible allergic reaction.    Lab work and scans not warranted at today's visit.      Scans done today were interpreted/confirmed by radiologist and also interpreted by me which included OB ultrasound limited.  Ultrasound shows single live intrauterine  gestation corresponding to 26 weeks 4 days ±14 days with fetal heart tones of 152 bpm.    Medications given at today's visit include IM Solu-Medrol, p.o. Pepcid, p.o. Benadryl    I saw this patient independently.  Patient presents today for possible allergic reaction, but has no evidence for tongue swelling, pharyngeal swelling, or oral involvement.  She is oxygenating well on room air with no stridor no hypotension and stable vital signs.  She has no nausea or vomiting and no evidence for widespread rash.  Medications given and patient feeling significant improved with resolution of her subjective symptoms with medications.  Fetal heart tones attempted with Doppler, but unable to be successful so therefore OB ultrasound done to ensure for safety of fetus.  Ultrasound shows live intrauterine pregnancy with fetal heart tones within normal range at 152 bpm with no complications.  Given improvement of patient symptoms and resolution with stable fetal heart tones and otherwise patient remaining hemodynamically stable she is able to be discharged to follow-up closely outpatient with primary care provider as well as OB/GYN.  She was educated to continue antihistamines at home and given course of steroids to use at home.  She is agreeable to plan and discharge at this time.  Emergent pathologies were considered for this patient, although I have low suspicion for anything acutely emergent given patient's clinical presentation, history, physical exam, stable vital signs, and relatively unremarkable workup.  Discharging patient home is reasonable plan of care for outpatient management.    All labs, imaging, and diagnostic studies were reviewed by me and patient was counseled on clinical impression, expectations, and plan.  Patient was educated to follow-up with PCP in the following 1-2 days.  All questions from patient were answered. They elicited understanding and were agreeable to course of treatment.  Patient was  discharged in stable condition and given strict return precautions.      Prescriptions given on discharge: PO medrol dosepak    ** Disclaimer:  Parts of this document were written utilizing a voice to text dictation software.  Note may contain minor transcription or typographical errors that were inadvertently transcribed by the computer software.        Procedure  Procedures     Chrissie Hart PA-C  07/29/24 3257

## 2024-07-29 NOTE — DISCHARGE INSTRUCTIONS
Follow close with your primary care provider as well as your OB/GYN in the following week.    Continue Benadryl or over-the-counter antihistamines help with symptoms additionally given steroids to help.

## 2024-07-29 NOTE — PROGRESS NOTES
See Allergy Shot Immunotherapy Record Book  Allergy checklist done, no concerns                  Subjective   Patient ID:   [id number]   Melissa Garcia is a 29 y.o. female who presents for No chief complaint on file..    No chief complaint on file.         HPI  This patient is here to evaluate for:      Review of Systems      Objective     LMP 01/25/2024      Physical Exam       Current Outpatient Medications   Medication Sig Dispense Refill    EPINEPHrine 0.3 mg/0.3 mL injection syringe Inject 0.3 mL (0.3 mg) into the muscle. AS DIRECTED.      loratadine (Claritin) 10 mg tablet Take 1 tablet (10 mg) by mouth once daily.      methylPREDNISolone (Medrol Dospak) 4 mg tablets Follow schedule on package instructions 21 tablet 0    PNV Comb12/Iron Cb/FA1/DSS/DHA (PRENATAL 12-IRON-FA1-DSS-DHA ORAL) Take 1 tablet by mouth once daily.       No current facility-administered medications for this visit.       Summary of the labs over the past 6 months:    Lab on 04/08/2024   Component Date Value Ref Range Status    Scan Result 04/08/2024 Sent to Ordering Provider   Final    Scan Result 04/08/2024 Sent to Ordering Provider   Final    HIV 1/2 Antigen/Antibody Screen wi* 04/08/2024 Nonreactive  Nonreactive Final    WBC 04/08/2024 11.8 (H)  4.4 - 11.3 x10*3/uL Final    nRBC 04/08/2024 0.0  0.0 - 0.0 /100 WBCs Final    RBC 04/08/2024 4.84  4.00 - 5.20 x10*6/uL Final    Hemoglobin 04/08/2024 13.6  12.0 - 16.0 g/dL Final    Hematocrit 04/08/2024 42.6  36.0 - 46.0 % Final    MCV 04/08/2024 88  80 - 100 fL Final    MCH 04/08/2024 28.1  26.0 - 34.0 pg Final    MCHC 04/08/2024 31.9 (L)  32.0 - 36.0 g/dL Final    RDW 04/08/2024 13.6  11.5 - 14.5 % Final    Platelets 04/08/2024 356  150 - 450 x10*3/uL Final    Hepatitis C AB 04/08/2024 Nonreactive  Nonreactive Final    Urine Culture 04/08/2024 No significant growth   Final    Syphilis Total Ab 04/08/2024 Nonreactive  Nonreactive Final    Hemoglobin A1C 04/08/2024 4.9  see below % Final     Estimated Average Glucose 04/08/2024 94  Not Established mg/dL Final    Rubella, IgG 04/08/2024 Negative  Negative Final    Rubella, IgG Index 04/08/2024 0.6  <=0.7 IA IA Final    ABO TYPE 04/08/2024 B   Final    Rh TYPE 04/08/2024 POS   Final    ANTIBODY SCREEN 04/08/2024 NEG   Final    Hepatitis B Surface AG 04/08/2024 Nonreactive  Nonreactive Final   Lab Requisition on 03/20/2024   Component Date Value Ref Range Status    Neisseria gonorrhea,Amplified 03/20/2024 Negative  Negative Final    Chlamydia trachomatis, Amplified 03/20/2024 Negative  Negative Final   Lab on 02/21/2024   Component Date Value Ref Range Status    HCG, Beta-Quantitative 02/21/2024 62 (H)  <5 mIU/mL Final    Thyroid Stimulating Hormone 02/21/2024 0.94  0.44 - 3.98 mIU/L Final   Lab Requisition on 01/08/2024   Component Date Value Ref Range Status    Urine Culture 01/08/2024 No growth   Final         Assessment/Plan           Jose Saba MD

## 2024-08-09 ENCOUNTER — APPOINTMENT (OUTPATIENT)
Dept: PRIMARY CARE | Facility: CLINIC | Age: 29
End: 2024-08-09
Payer: COMMERCIAL

## 2024-08-26 ENCOUNTER — APPOINTMENT (OUTPATIENT)
Dept: ALLERGY | Facility: CLINIC | Age: 29
End: 2024-08-26
Payer: COMMERCIAL

## 2024-08-26 DIAGNOSIS — H10.45 CHRONIC ALLERGIC CONJUNCTIVITIS: ICD-10-CM

## 2024-08-26 DIAGNOSIS — J30.81 ALLERGIC RHINITIS DUE TO ANIMAL HAIR AND DANDER: ICD-10-CM

## 2024-08-26 DIAGNOSIS — J30.1 ALLERGIC RHINITIS DUE TO POLLEN, UNSPECIFIED SEASONALITY: Primary | ICD-10-CM

## 2024-08-26 PROCEDURE — 99214 OFFICE O/P EST MOD 30 MIN: CPT | Performed by: ALLERGY & IMMUNOLOGY

## 2024-08-26 PROCEDURE — 95117 IMMUNOTHERAPY INJECTIONS: CPT | Performed by: ALLERGY & IMMUNOLOGY

## 2024-08-26 NOTE — PROGRESS NOTES
Subjective   Patient ID:   [id number]   Melissa Garcia is a 29 y.o. female who presents for Allergic Rhinitis and Immunotherapy.    Chief Complaint   Patient presents with    Allergic Rhinitis    Immunotherapy          HPI  This patient is here to evaluate for:  31 weeks - having a baby girl.     Immunotherapy started: 22  This patient is taking allergy shots receiving (ml): 0.5 but today 0.25  Had a reaction last visit with 0.5ml  1 hr later she developed hives on chest and neck.  Throat tight  Went to the ED and given steroids and benadryl.  The doppler wasn't working so very traumatic.   She didn't take her med that day.    every: 4 week.   Vials : oct   Last shot: today  Current symptoms include: doing well - not on allergy meds except day of shots.   Peak flows: n/a  Location: [place]   Symptoms on the allergy shots have improved.  Medications : claritin prn      This patient is not on a beta blocker.  There have been no systemic or delayed allergic reactions to the allergy immunotherapy. The size of the shot reactions are small.         Review of Systems   All other systems reviewed and are negative.        Objective     LMP 2024      Physical Exam  Constitutional:       Appearance: Normal appearance.   HENT:      Head: Normocephalic and atraumatic.   Eyes:      Extraocular Movements: Extraocular movements intact.      Conjunctiva/sclera: Conjunctivae normal.      Pupils: Pupils are equal, round, and reactive to light.   Pulmonary:      Effort: Pulmonary effort is normal.   Musculoskeletal:      Cervical back: Normal range of motion.   Neurological:      Mental Status: She is alert and oriented to person, place, and time. Mental status is at baseline.   Psychiatric:         Mood and Affect: Mood normal.         Behavior: Behavior normal.         Thought Content: Thought content normal.         Judgment: Judgment normal.            Current Outpatient Medications   Medication Sig Dispense  Refill    EPINEPHrine 0.3 mg/0.3 mL injection syringe Inject 0.3 mL (0.3 mg) into the muscle. AS DIRECTED.      loratadine (Claritin) 10 mg tablet Take 1 tablet (10 mg) by mouth once daily.      PNV Comb12/Iron Cb/FA1/DSS/DHA (PRENATAL 12-IRON-FA1-DSS-DHA ORAL) Take 1 tablet by mouth once daily.       No current facility-administered medications for this visit.       Summary of the labs over the past 6 months:    Lab on 07/15/2024   Component Date Value Ref Range Status    WBC 07/15/2024 13.3 (H)  4.4 - 11.3 x10*3/uL Final    nRBC 07/15/2024 0.0  0.0 - 0.0 /100 WBCs Final    RBC 07/15/2024 4.36  4.00 - 5.20 x10*6/uL Final    Hemoglobin 07/15/2024 12.2  12.0 - 16.0 g/dL Final    Hematocrit 07/15/2024 38.8  36.0 - 46.0 % Final    MCV 07/15/2024 89  80 - 100 fL Final    MCH 07/15/2024 28.0  26.0 - 34.0 pg Final    MCHC 07/15/2024 31.4 (L)  32.0 - 36.0 g/dL Final    RDW 07/15/2024 13.6  11.5 - 14.5 % Final    Platelets 07/15/2024 308  150 - 450 x10*3/uL Final    Thyroid Stimulating Hormone 07/15/2024 1.07  0.44 - 3.98 mIU/L Final    Glucose, 1 Hr Screen, Preg 07/15/2024 81  <135 mg/dL Final   Lab on 04/08/2024   Component Date Value Ref Range Status    Scan Result 04/08/2024 Sent to Ordering Provider   Final    Scan Result 04/08/2024 Sent to Ordering Provider   Final    HIV 1/2 Antigen/Antibody Screen wi* 04/08/2024 Nonreactive  Nonreactive Final    WBC 04/08/2024 11.8 (H)  4.4 - 11.3 x10*3/uL Final    nRBC 04/08/2024 0.0  0.0 - 0.0 /100 WBCs Final    RBC 04/08/2024 4.84  4.00 - 5.20 x10*6/uL Final    Hemoglobin 04/08/2024 13.6  12.0 - 16.0 g/dL Final    Hematocrit 04/08/2024 42.6  36.0 - 46.0 % Final    MCV 04/08/2024 88  80 - 100 fL Final    MCH 04/08/2024 28.1  26.0 - 34.0 pg Final    MCHC 04/08/2024 31.9 (L)  32.0 - 36.0 g/dL Final    RDW 04/08/2024 13.6  11.5 - 14.5 % Final    Platelets 04/08/2024 356  150 - 450 x10*3/uL Final    Hepatitis C AB 04/08/2024 Nonreactive  Nonreactive Final    Urine Culture 04/08/2024  No significant growth   Final    Syphilis Total Ab 04/08/2024 Nonreactive  Nonreactive Final    Hemoglobin A1C 04/08/2024 4.9  see below % Final    Estimated Average Glucose 04/08/2024 94  Not Established mg/dL Final    Rubella, IgG 04/08/2024 Negative  Negative Final    Rubella, IgG Index 04/08/2024 0.6  <=0.7 IA IA Final    ABO TYPE 04/08/2024 B   Final    Rh TYPE 04/08/2024 POS   Final    ANTIBODY SCREEN 04/08/2024 NEG   Final    Hepatitis B Surface AG 04/08/2024 Nonreactive  Nonreactive Final   Lab Requisition on 03/20/2024   Component Date Value Ref Range Status    Neisseria gonorrhea,Amplified 03/20/2024 Negative  Negative Final    Chlamydia trachomatis, Amplified 03/20/2024 Negative  Negative Final         Assessment/Plan   Diagnoses and all orders for this visit:  Allergic rhinitis due to pollen, unspecified seasonality  Allergic rhinitis due to animal hair and dander  Chronic allergic conjunctivitis      I reviewed the previous allergy skin testing and current vial formulas. We reviewed the benefits of immunotherapy and continuing maintenance immunotherapy dosing at approximately once per month. We assessed for improvement of symptoms and for any occurrence of local, delayed, or systemic reactions. The patient's symptoms have improved since starting immunotherapy and they have not had any systemic or significant allergic reactions.     The patient has an epinephrine autoinjector, brings it to the injection visits, and is aware of how to use it and waits 30 minutes after the shot as directed.     Medications have been refilled as needed.     For now, will hold at 0.25ml until she delivers and then in the future we can build back up to 0.5ml    Jose Saba MD

## 2024-09-09 ENCOUNTER — APPOINTMENT (OUTPATIENT)
Dept: PRIMARY CARE | Facility: CLINIC | Age: 29
End: 2024-09-09
Payer: COMMERCIAL

## 2024-09-09 VITALS
TEMPERATURE: 97.2 F | BODY MASS INDEX: 27.6 KG/M2 | HEART RATE: 134 BPM | SYSTOLIC BLOOD PRESSURE: 120 MMHG | DIASTOLIC BLOOD PRESSURE: 64 MMHG | OXYGEN SATURATION: 98 % | WEIGHT: 171 LBS

## 2024-09-09 DIAGNOSIS — J06.9 UPPER RESPIRATORY TRACT INFECTION, UNSPECIFIED TYPE: ICD-10-CM

## 2024-09-09 PROCEDURE — 87632 RESP VIRUS 6-11 TARGETS: CPT

## 2024-09-09 PROCEDURE — 99212 OFFICE O/P EST SF 10 MIN: CPT | Performed by: INTERNAL MEDICINE

## 2024-09-09 PROCEDURE — 1036F TOBACCO NON-USER: CPT | Performed by: INTERNAL MEDICINE

## 2024-09-09 RX ORDER — PROMETHAZINE HYDROCHLORIDE 12.5 MG/1
TABLET ORAL
COMMUNITY
Start: 2024-04-12 | End: 2024-09-09 | Stop reason: WASHOUT

## 2024-09-09 ASSESSMENT — ENCOUNTER SYMPTOMS
WHEEZING: 0
POLYDIPSIA: 0
PHOTOPHOBIA: 0
CONFUSION: 0
ACTIVITY CHANGE: 0
SHORTNESS OF BREATH: 0
BLOOD IN STOOL: 0
NUMBNESS: 0
POLYPHAGIA: 0
HEMATURIA: 0
SPEECH DIFFICULTY: 0
NAUSEA: 0
ARTHRALGIAS: 0
MYALGIAS: 0
WEAKNESS: 0
VOMITING: 0
DIZZINESS: 0
HALLUCINATIONS: 0
SEIZURES: 0
ABDOMINAL PAIN: 0
VOICE CHANGE: 0
COLOR CHANGE: 0
CONSTIPATION: 0
TROUBLE SWALLOWING: 0
FACIAL ASYMMETRY: 0
NERVOUS/ANXIOUS: 0
SLEEP DISTURBANCE: 0
APPETITE CHANGE: 0
UNEXPECTED WEIGHT CHANGE: 0
FEVER: 0
COUGH: 1
HEADACHES: 0
PALPITATIONS: 0
JOINT SWELLING: 0
NECK PAIN: 0
SINUS PAIN: 0
SORE THROAT: 0
STRIDOR: 0
ADENOPATHY: 0
CHOKING: 0
CHEST TIGHTNESS: 0
TREMORS: 0
FLANK PAIN: 0
DIARRHEA: 0
FATIGUE: 1
WOUND: 0
BACK PAIN: 0
DYSURIA: 0
EYE PAIN: 0

## 2024-09-09 ASSESSMENT — PAIN SCALES - GENERAL: PAINLEVEL: 0-NO PAIN

## 2024-09-09 NOTE — PROGRESS NOTES
Subjective   Patient ID: Melissa Garcia is a 29 y.o. female who presents for URI (Since last week, nephew has RSV. 2 negative home covid tests).    HPI   Patient presented to the office for URI symptoms from last week. Her nephew had RSV and she is 32 weeks pregnant and want to check for it. She is taking cough medication and her OBGYN told her to take Robitussin for cough.     Review of Systems   Constitutional:  Positive for fatigue. Negative for activity change, appetite change, fever and unexpected weight change.   HENT:  Positive for congestion. Negative for dental problem, ear discharge, hearing loss, nosebleeds, postnasal drip, sinus pain, sore throat, trouble swallowing and voice change.    Eyes:  Negative for photophobia, pain and visual disturbance.   Respiratory:  Positive for cough. Negative for choking, chest tightness, shortness of breath, wheezing and stridor.    Cardiovascular:  Negative for chest pain, palpitations and leg swelling.   Gastrointestinal:  Negative for abdominal pain, blood in stool, constipation, diarrhea, nausea and vomiting.   Endocrine: Negative for polydipsia, polyphagia and polyuria.   Genitourinary:  Negative for decreased urine volume, dyspareunia, dysuria, flank pain, hematuria and urgency.   Musculoskeletal:  Negative for arthralgias, back pain, gait problem, joint swelling, myalgias and neck pain.   Skin:  Negative for color change, rash and wound.   Allergic/Immunologic: Negative for environmental allergies and food allergies.   Neurological:  Negative for dizziness, tremors, seizures, syncope, facial asymmetry, speech difficulty, weakness, numbness and headaches.   Hematological:  Negative for adenopathy.   Psychiatric/Behavioral:  Negative for behavioral problems, confusion, hallucinations, self-injury, sleep disturbance and suicidal ideas. The patient is not nervous/anxious.      Objective   /64   Pulse (!) 134   Temp 36.2 °C (97.2 °F)   Wt 77.6 kg (171 lb)    LMP 01/25/2024   SpO2 98%   BMI 27.60 kg/m²     Physical Exam  Constitutional:       General: She is not in acute distress.     Appearance: She is not ill-appearing or toxic-appearing.   HENT:      Nose: Congestion present.      Mouth/Throat:      Pharynx: No oropharyngeal exudate or posterior oropharyngeal erythema.   Eyes:      Conjunctiva/sclera: Conjunctivae normal.   Cardiovascular:      Rate and Rhythm: Normal rate and regular rhythm.      Pulses: Normal pulses.      Heart sounds: Normal heart sounds. No murmur heard.  Pulmonary:      Effort: Pulmonary effort is normal. No respiratory distress.      Breath sounds: Normal breath sounds. No stridor. No wheezing, rhonchi or rales.   Musculoskeletal:         General: Normal range of motion.      Cervical back: Normal range of motion.   Skin:     General: Skin is warm.   Neurological:      General: No focal deficit present.      Mental Status: She is alert and oriented to person, place, and time.   Psychiatric:         Mood and Affect: Mood normal.         Behavior: Behavior normal.         Thought Content: Thought content normal.         Judgment: Judgment normal.       Assessment/Plan   Problem List Items Addressed This Visit    None  Visit Diagnoses       Upper respiratory tract infection, unspecified type        Relevant Orders    Respiratory Viral Panel        Her HR is high but other vitals are normal. HR was high in the past as well.

## 2024-09-10 DIAGNOSIS — J30.89 ALLERGIC RHINITIS DUE TO OTHER ALLERGIC TRIGGER, UNSPECIFIED SEASONALITY: Primary | ICD-10-CM

## 2024-09-10 PROCEDURE — 95165 ANTIGEN THERAPY SERVICES: CPT | Performed by: ALLERGY & IMMUNOLOGY

## 2024-09-11 LAB
ADENOVIRUS RVP, VIRC: NOT DETECTED
ENTEROVIRUS/RHINOVIRUS RVP, VIRC: NOT DETECTED
HUMAN BOCAVIRUS RVP, VIRC: NOT DETECTED
HUMAN CORONAVIRUS RVP, VIRC: NOT DETECTED
INFLUENZA A , VIRC: NOT DETECTED
INFLUENZA A H1N1-09 , VIRC: NOT DETECTED
INFLUENZA B PCR, VIRC: NOT DETECTED
METAPNEUMOVIRUS , VIRC: NOT DETECTED
PARAINFLUENZA PCR, VIRC: NOT DETECTED
RSV PCR, RVP, VIRC: POSITIVE

## 2024-09-23 ENCOUNTER — APPOINTMENT (OUTPATIENT)
Dept: ALLERGY | Facility: CLINIC | Age: 29
End: 2024-09-23
Payer: COMMERCIAL

## 2024-09-23 DIAGNOSIS — J30.1 ALLERGIC RHINITIS DUE TO POLLEN, UNSPECIFIED SEASONALITY: ICD-10-CM

## 2024-09-23 PROCEDURE — 95117 IMMUNOTHERAPY INJECTIONS: CPT | Performed by: ALLERGY & IMMUNOLOGY

## 2024-09-30 ENCOUNTER — LAB REQUISITION (OUTPATIENT)
Dept: LAB | Facility: HOSPITAL | Age: 29
End: 2024-09-30
Payer: COMMERCIAL

## 2024-09-30 DIAGNOSIS — Z34.03 ENCOUNTER FOR SUPERVISION OF NORMAL FIRST PREGNANCY, THIRD TRIMESTER: ICD-10-CM

## 2024-09-30 PROCEDURE — 87081 CULTURE SCREEN ONLY: CPT

## 2024-10-03 LAB — GP B STREP GENITAL QL CULT: NORMAL

## 2024-10-07 ENCOUNTER — APPOINTMENT (OUTPATIENT)
Dept: ALLERGY | Facility: CLINIC | Age: 29
End: 2024-10-07
Payer: COMMERCIAL

## 2024-10-07 DIAGNOSIS — J30.89 ALLERGIC RHINITIS DUE TO OTHER ALLERGIC TRIGGER, UNSPECIFIED SEASONALITY: ICD-10-CM

## 2024-10-07 PROCEDURE — 95117 IMMUNOTHERAPY INJECTIONS: CPT | Performed by: ALLERGY & IMMUNOLOGY

## 2024-10-14 ENCOUNTER — APPOINTMENT (OUTPATIENT)
Dept: ALLERGY | Facility: CLINIC | Age: 29
End: 2024-10-14
Payer: COMMERCIAL

## 2024-10-14 DIAGNOSIS — J30.89 ALLERGIC RHINITIS DUE TO OTHER ALLERGIC TRIGGER, UNSPECIFIED SEASONALITY: ICD-10-CM

## 2024-10-14 PROCEDURE — 95117 IMMUNOTHERAPY INJECTIONS: CPT | Performed by: ALLERGY & IMMUNOLOGY

## 2024-10-17 ENCOUNTER — APPOINTMENT (OUTPATIENT)
Dept: OBSTETRICS AND GYNECOLOGY | Facility: HOSPITAL | Age: 29
End: 2024-10-17
Payer: COMMERCIAL

## 2024-10-17 ENCOUNTER — HOSPITAL ENCOUNTER (INPATIENT)
Facility: HOSPITAL | Age: 29
LOS: 3 days | Discharge: HOME | End: 2024-10-20
Payer: COMMERCIAL

## 2024-10-17 DIAGNOSIS — O36.5930 IUGR (INTRAUTERINE GROWTH RESTRICTION) AFFECTING CARE OF MOTHER, THIRD TRIMESTER, NOT APPLICABLE OR UNSPECIFIED FETUS (HHS-HCC): ICD-10-CM

## 2024-10-17 DIAGNOSIS — R52 POSTPARTUM PAIN (HHS-HCC): Primary | ICD-10-CM

## 2024-10-17 PROBLEM — Z34.90 TERM PREGNANCY (HHS-HCC): Status: ACTIVE | Noted: 2024-10-17

## 2024-10-17 LAB
ABO GROUP (TYPE) IN BLOOD: NORMAL
ABO GROUP (TYPE) IN BLOOD: NORMAL
ANTIBODY SCREEN: NORMAL
ERYTHROCYTE [DISTWIDTH] IN BLOOD BY AUTOMATED COUNT: 14.2 % (ref 11.5–14.5)
HCT VFR BLD AUTO: 37.2 % (ref 36–46)
HGB BLD-MCNC: 12.1 G/DL (ref 12–16)
MCH RBC QN AUTO: 26.4 PG (ref 26–34)
MCHC RBC AUTO-ENTMCNC: 32.5 G/DL (ref 32–36)
MCV RBC AUTO: 81 FL (ref 80–100)
NRBC BLD-RTO: 0 /100 WBCS (ref 0–0)
PLATELET # BLD AUTO: 308 X10*3/UL (ref 150–450)
RBC # BLD AUTO: 4.59 X10*6/UL (ref 4–5.2)
RH FACTOR (ANTIGEN D): NORMAL
RH FACTOR (ANTIGEN D): NORMAL
WBC # BLD AUTO: 15.7 X10*3/UL (ref 4.4–11.3)

## 2024-10-17 PROCEDURE — 36415 COLL VENOUS BLD VENIPUNCTURE: CPT

## 2024-10-17 PROCEDURE — 86901 BLOOD TYPING SEROLOGIC RH(D): CPT

## 2024-10-17 PROCEDURE — 1220000001 HC OB SEMI-PRIVATE ROOM DAILY

## 2024-10-17 PROCEDURE — 2500000004 HC RX 250 GENERAL PHARMACY W/ HCPCS (ALT 636 FOR OP/ED)

## 2024-10-17 PROCEDURE — 85027 COMPLETE CBC AUTOMATED: CPT

## 2024-10-17 PROCEDURE — 3E0DXGC INTRODUCTION OF OTHER THERAPEUTIC SUBSTANCE INTO MOUTH AND PHARYNX, EXTERNAL APPROACH: ICD-10-PCS | Performed by: STUDENT IN AN ORGANIZED HEALTH CARE EDUCATION/TRAINING PROGRAM

## 2024-10-17 PROCEDURE — 3E0P7VZ INTRODUCTION OF HORMONE INTO FEMALE REPRODUCTIVE, VIA NATURAL OR ARTIFICIAL OPENING: ICD-10-PCS | Performed by: STUDENT IN AN ORGANIZED HEALTH CARE EDUCATION/TRAINING PROGRAM

## 2024-10-17 PROCEDURE — 86780 TREPONEMA PALLIDUM: CPT | Mod: TRILAB,WESLAB

## 2024-10-17 PROCEDURE — 7210000002 HC LABOR PER HOUR

## 2024-10-17 PROCEDURE — 2500000002 HC RX 250 W HCPCS SELF ADMINISTERED DRUGS (ALT 637 FOR MEDICARE OP, ALT 636 FOR OP/ED)

## 2024-10-17 PROCEDURE — 59050 FETAL MONITOR W/REPORT: CPT

## 2024-10-17 RX ORDER — LABETALOL HYDROCHLORIDE 5 MG/ML
20 INJECTION, SOLUTION INTRAVENOUS ONCE AS NEEDED
Status: DISCONTINUED | OUTPATIENT
Start: 2024-10-17 | End: 2024-10-18

## 2024-10-17 RX ORDER — TRANEXAMIC ACID 10 MG/ML
1000 INJECTION, SOLUTION INTRAVENOUS ONCE AS NEEDED
Status: DISCONTINUED | OUTPATIENT
Start: 2024-10-17 | End: 2024-10-18

## 2024-10-17 RX ORDER — HYDRALAZINE HYDROCHLORIDE 20 MG/ML
5 INJECTION INTRAMUSCULAR; INTRAVENOUS ONCE AS NEEDED
Status: DISCONTINUED | OUTPATIENT
Start: 2024-10-17 | End: 2024-10-18

## 2024-10-17 RX ORDER — METHYLERGONOVINE MALEATE 0.2 MG/ML
0.2 INJECTION INTRAVENOUS ONCE AS NEEDED
Status: DISCONTINUED | OUTPATIENT
Start: 2024-10-17 | End: 2024-10-18

## 2024-10-17 RX ORDER — OXYTOCIN/0.9 % SODIUM CHLORIDE 30/500 ML
60 PLASTIC BAG, INJECTION (ML) INTRAVENOUS ONCE AS NEEDED
Status: DISCONTINUED | OUTPATIENT
Start: 2024-10-17 | End: 2024-10-18

## 2024-10-17 RX ORDER — SODIUM CHLORIDE, SODIUM LACTATE, POTASSIUM CHLORIDE, CALCIUM CHLORIDE 600; 310; 30; 20 MG/100ML; MG/100ML; MG/100ML; MG/100ML
125 INJECTION, SOLUTION INTRAVENOUS CONTINUOUS
Status: DISCONTINUED | OUTPATIENT
Start: 2024-10-17 | End: 2024-10-18

## 2024-10-17 RX ORDER — LIDOCAINE HYDROCHLORIDE 10 MG/ML
30 INJECTION, SOLUTION INFILTRATION; PERINEURAL ONCE AS NEEDED
Status: DISCONTINUED | OUTPATIENT
Start: 2024-10-17 | End: 2024-10-18

## 2024-10-17 RX ORDER — ONDANSETRON HYDROCHLORIDE 2 MG/ML
4 INJECTION, SOLUTION INTRAVENOUS EVERY 6 HOURS PRN
Status: DISCONTINUED | OUTPATIENT
Start: 2024-10-17 | End: 2024-10-18

## 2024-10-17 RX ORDER — MISOPROSTOL 200 UG/1
800 TABLET ORAL ONCE AS NEEDED
Status: DISCONTINUED | OUTPATIENT
Start: 2024-10-17 | End: 2024-10-18

## 2024-10-17 RX ORDER — NIFEDIPINE 10 MG/1
10 CAPSULE ORAL ONCE AS NEEDED
Status: DISCONTINUED | OUTPATIENT
Start: 2024-10-17 | End: 2024-10-18

## 2024-10-17 RX ORDER — LOPERAMIDE HYDROCHLORIDE 2 MG/1
4 CAPSULE ORAL EVERY 2 HOUR PRN
Status: DISCONTINUED | OUTPATIENT
Start: 2024-10-17 | End: 2024-10-18

## 2024-10-17 RX ORDER — CARBOPROST TROMETHAMINE 250 UG/ML
250 INJECTION, SOLUTION INTRAMUSCULAR ONCE AS NEEDED
Status: DISCONTINUED | OUTPATIENT
Start: 2024-10-17 | End: 2024-10-18

## 2024-10-17 RX ORDER — OXYTOCIN 10 [USP'U]/ML
10 INJECTION, SOLUTION INTRAMUSCULAR; INTRAVENOUS ONCE AS NEEDED
Status: DISCONTINUED | OUTPATIENT
Start: 2024-10-17 | End: 2024-10-18

## 2024-10-17 RX ORDER — TERBUTALINE SULFATE 1 MG/ML
0.25 INJECTION SUBCUTANEOUS ONCE AS NEEDED
Status: DISCONTINUED | OUTPATIENT
Start: 2024-10-17 | End: 2024-10-18

## 2024-10-17 RX ORDER — ONDANSETRON 4 MG/1
4 TABLET, FILM COATED ORAL EVERY 6 HOURS PRN
Status: DISCONTINUED | OUTPATIENT
Start: 2024-10-17 | End: 2024-10-18

## 2024-10-17 RX ORDER — MISOPROSTOL 100 UG/1
25 TABLET ORAL
Status: DISCONTINUED | OUTPATIENT
Start: 2024-10-17 | End: 2024-10-18

## 2024-10-17 RX ORDER — BUTORPHANOL TARTRATE 2 MG/ML
2 INJECTION INTRAMUSCULAR; INTRAVENOUS
Status: DISCONTINUED | OUTPATIENT
Start: 2024-10-17 | End: 2024-10-18

## 2024-10-17 SDOH — HEALTH STABILITY: MENTAL HEALTH: NON-SPECIFIC ACTIVE SUICIDAL THOUGHTS (PAST 1 MONTH): NO

## 2024-10-17 SDOH — SOCIAL STABILITY: SOCIAL INSECURITY: VERBAL ABUSE: DENIES

## 2024-10-17 SDOH — SOCIAL STABILITY: SOCIAL INSECURITY: WITHIN THE LAST YEAR, HAVE YOU BEEN AFRAID OF YOUR PARTNER OR EX-PARTNER?: NO

## 2024-10-17 SDOH — SOCIAL STABILITY: SOCIAL INSECURITY: HAVE YOU HAD THOUGHTS OF HARMING ANYONE ELSE?: NO

## 2024-10-17 SDOH — HEALTH STABILITY: MENTAL HEALTH: SUICIDAL BEHAVIOR (LIFETIME): NO

## 2024-10-17 SDOH — SOCIAL STABILITY: SOCIAL INSECURITY: DO YOU FEEL ANYONE HAS EXPLOITED OR TAKEN ADVANTAGE OF YOU FINANCIALLY OR OF YOUR PERSONAL PROPERTY?: NO

## 2024-10-17 SDOH — SOCIAL STABILITY: SOCIAL INSECURITY: HAVE YOU HAD ANY THOUGHTS OF HARMING ANYONE ELSE?: NO

## 2024-10-17 SDOH — SOCIAL STABILITY: SOCIAL INSECURITY: HAS ANYONE EVER THREATENED TO HURT YOUR FAMILY OR YOUR PETS?: NO

## 2024-10-17 SDOH — ECONOMIC STABILITY: HOUSING INSECURITY: DO YOU FEEL UNSAFE GOING BACK TO THE PLACE WHERE YOU ARE LIVING?: NO

## 2024-10-17 SDOH — SOCIAL STABILITY: SOCIAL INSECURITY: WITHIN THE LAST YEAR, HAVE YOU BEEN HUMILIATED OR EMOTIONALLY ABUSED IN OTHER WAYS BY YOUR PARTNER OR EX-PARTNER?: NO

## 2024-10-17 SDOH — SOCIAL STABILITY: SOCIAL INSECURITY
WITHIN THE LAST YEAR, HAVE YOU BEEN RAPED OR FORCED TO HAVE ANY KIND OF SEXUAL ACTIVITY BY YOUR PARTNER OR EX-PARTNER?: NO

## 2024-10-17 SDOH — HEALTH STABILITY: MENTAL HEALTH: STRENGTHS (MUST CHOOSE TWO): SUPPORT FROM ORGANIZED COMMUNITY;SENSE OF HUMOR

## 2024-10-17 SDOH — HEALTH STABILITY: MENTAL HEALTH: HAVE YOU USED ANY PRESCRIPTION DRUGS OTHER THAN PRESCRIBED IN THE PAST 12 MONTHS?: NO

## 2024-10-17 SDOH — SOCIAL STABILITY: SOCIAL INSECURITY: ARE YOU OR HAVE YOU BEEN THREATENED OR ABUSED PHYSICALLY, EMOTIONALLY, OR SEXUALLY BY ANYONE?: NO

## 2024-10-17 SDOH — SOCIAL STABILITY: SOCIAL INSECURITY: ABUSE SCREEN: ADULT

## 2024-10-17 SDOH — SOCIAL STABILITY: SOCIAL INSECURITY
WITHIN THE LAST YEAR, HAVE YOU BEEN KICKED, HIT, SLAPPED, OR OTHERWISE PHYSICALLY HURT BY YOUR PARTNER OR EX-PARTNER?: NO

## 2024-10-17 SDOH — HEALTH STABILITY: PHYSICAL HEALTH: ON AVERAGE, HOW MANY DAYS PER WEEK DO YOU ENGAGE IN MODERATE TO STRENUOUS EXERCISE (LIKE A BRISK WALK)?: 0 DAYS

## 2024-10-17 SDOH — SOCIAL STABILITY: SOCIAL INSECURITY: DOES ANYONE TRY TO KEEP YOU FROM HAVING/CONTACTING OTHER FRIENDS OR DOING THINGS OUTSIDE YOUR HOME?: NO

## 2024-10-17 SDOH — ECONOMIC STABILITY: FOOD INSECURITY: WITHIN THE PAST 12 MONTHS, THE FOOD YOU BOUGHT JUST DIDN'T LAST AND YOU DIDN'T HAVE MONEY TO GET MORE.: PATIENT DECLINED

## 2024-10-17 SDOH — ECONOMIC STABILITY: TRANSPORTATION INSECURITY: IN THE PAST 12 MONTHS, HAS LACK OF TRANSPORTATION KEPT YOU FROM MEDICAL APPOINTMENTS OR FROM GETTING MEDICATIONS?: NO

## 2024-10-17 SDOH — HEALTH STABILITY: MENTAL HEALTH: WERE YOU ABLE TO COMPLETE ALL THE BEHAVIORAL HEALTH SCREENINGS?: YES

## 2024-10-17 SDOH — ECONOMIC STABILITY: FOOD INSECURITY: HOW HARD IS IT FOR YOU TO PAY FOR THE VERY BASICS LIKE FOOD, HOUSING, MEDICAL CARE, AND HEATING?: NOT HARD AT ALL

## 2024-10-17 SDOH — HEALTH STABILITY: MENTAL HEALTH: WISH TO BE DEAD (PAST 1 MONTH): NO

## 2024-10-17 SDOH — HEALTH STABILITY: MENTAL HEALTH: HAVE YOU USED ANY SUBSTANCES (CANABIS, COCAINE, HEROIN, HALLUCINOGENS, INHALANTS, ETC.) IN THE PAST 12 MONTHS?: NO

## 2024-10-17 SDOH — ECONOMIC STABILITY: FOOD INSECURITY
WITHIN THE PAST 12 MONTHS, YOU WORRIED THAT YOUR FOOD WOULD RUN OUT BEFORE YOU GOT THE MONEY TO BUY MORE.: PATIENT DECLINED

## 2024-10-17 SDOH — SOCIAL STABILITY: SOCIAL INSECURITY: ARE THERE ANY APPARENT SIGNS OF INJURIES/BEHAVIORS THAT COULD BE RELATED TO ABUSE/NEGLECT?: NO

## 2024-10-17 SDOH — SOCIAL STABILITY: SOCIAL INSECURITY: PHYSICAL ABUSE: DENIES

## 2024-10-17 ASSESSMENT — PAIN SCALES - GENERAL: PAINLEVEL_OUTOF10: 0 - NO PAIN

## 2024-10-17 ASSESSMENT — PATIENT HEALTH QUESTIONNAIRE - PHQ9
SUM OF ALL RESPONSES TO PHQ9 QUESTIONS 1 & 2: 0
1. LITTLE INTEREST OR PLEASURE IN DOING THINGS: NOT AT ALL
2. FEELING DOWN, DEPRESSED OR HOPELESS: NOT AT ALL

## 2024-10-17 ASSESSMENT — LIFESTYLE VARIABLES
HOW MANY STANDARD DRINKS CONTAINING ALCOHOL DO YOU HAVE ON A TYPICAL DAY: PATIENT DOES NOT DRINK
HOW OFTEN DO YOU HAVE 6 OR MORE DRINKS ON ONE OCCASION: NEVER
AUDIT-C TOTAL SCORE: 0
HOW OFTEN DO YOU HAVE A DRINK CONTAINING ALCOHOL: NEVER
SKIP TO QUESTIONS 9-10: 1
AUDIT-C TOTAL SCORE: 0

## 2024-10-17 ASSESSMENT — ACTIVITIES OF DAILY LIVING (ADL): LACK_OF_TRANSPORTATION: NO

## 2024-10-18 ENCOUNTER — ANESTHESIA EVENT (OUTPATIENT)
Dept: OBSTETRICS AND GYNECOLOGY | Facility: HOSPITAL | Age: 29
End: 2024-10-18

## 2024-10-18 ENCOUNTER — ANESTHESIA (OUTPATIENT)
Dept: OBSTETRICS AND GYNECOLOGY | Facility: HOSPITAL | Age: 29
End: 2024-10-18

## 2024-10-18 LAB — TREPONEMA PALLIDUM IGG+IGM AB [PRESENCE] IN SERUM OR PLASMA BY IMMUNOASSAY: NONREACTIVE

## 2024-10-18 PROCEDURE — 2500000005 HC RX 250 GENERAL PHARMACY W/O HCPCS: Performed by: NURSE ANESTHETIST, CERTIFIED REGISTERED

## 2024-10-18 PROCEDURE — 1220000001 HC OB SEMI-PRIVATE ROOM DAILY

## 2024-10-18 PROCEDURE — 0KQM0ZZ REPAIR PERINEUM MUSCLE, OPEN APPROACH: ICD-10-PCS | Performed by: STUDENT IN AN ORGANIZED HEALTH CARE EDUCATION/TRAINING PROGRAM

## 2024-10-18 PROCEDURE — 3700000014 EPIDURAL BLOCK: Performed by: NURSE ANESTHETIST, CERTIFIED REGISTERED

## 2024-10-18 PROCEDURE — 59409 OBSTETRICAL CARE: CPT | Performed by: STUDENT IN AN ORGANIZED HEALTH CARE EDUCATION/TRAINING PROGRAM

## 2024-10-18 PROCEDURE — 2500000002 HC RX 250 W HCPCS SELF ADMINISTERED DRUGS (ALT 637 FOR MEDICARE OP, ALT 636 FOR OP/ED)

## 2024-10-18 PROCEDURE — 2500000004 HC RX 250 GENERAL PHARMACY W/ HCPCS (ALT 636 FOR OP/ED)

## 2024-10-18 PROCEDURE — 7100000016 HC LABOR RECOVERY PER HOUR

## 2024-10-18 PROCEDURE — 2500000001 HC RX 250 WO HCPCS SELF ADMINISTERED DRUGS (ALT 637 FOR MEDICARE OP): Performed by: STUDENT IN AN ORGANIZED HEALTH CARE EDUCATION/TRAINING PROGRAM

## 2024-10-18 PROCEDURE — 3E033VJ INTRODUCTION OF OTHER HORMONE INTO PERIPHERAL VEIN, PERCUTANEOUS APPROACH: ICD-10-PCS | Performed by: STUDENT IN AN ORGANIZED HEALTH CARE EDUCATION/TRAINING PROGRAM

## 2024-10-18 PROCEDURE — 51702 INSERT TEMP BLADDER CATH: CPT

## 2024-10-18 PROCEDURE — 7210000002 HC LABOR PER HOUR

## 2024-10-18 PROCEDURE — 51701 INSERT BLADDER CATHETER: CPT

## 2024-10-18 PROCEDURE — 2500000004 HC RX 250 GENERAL PHARMACY W/ HCPCS (ALT 636 FOR OP/ED): Performed by: NURSE ANESTHETIST, CERTIFIED REGISTERED

## 2024-10-18 RX ORDER — METHYLERGONOVINE MALEATE 0.2 MG/ML
0.2 INJECTION INTRAVENOUS ONCE AS NEEDED
Status: DISCONTINUED | OUTPATIENT
Start: 2024-10-18 | End: 2024-10-20 | Stop reason: HOSPADM

## 2024-10-18 RX ORDER — TRANEXAMIC ACID 10 MG/ML
1000 INJECTION, SOLUTION INTRAVENOUS ONCE AS NEEDED
Status: DISCONTINUED | OUTPATIENT
Start: 2024-10-18 | End: 2024-10-20 | Stop reason: HOSPADM

## 2024-10-18 RX ORDER — DIPHENHYDRAMINE HYDROCHLORIDE 50 MG/ML
25 INJECTION INTRAMUSCULAR; INTRAVENOUS EVERY 6 HOURS PRN
Status: DISCONTINUED | OUTPATIENT
Start: 2024-10-18 | End: 2024-10-20 | Stop reason: HOSPADM

## 2024-10-18 RX ORDER — MISOPROSTOL 200 UG/1
800 TABLET ORAL ONCE AS NEEDED
Status: DISCONTINUED | OUTPATIENT
Start: 2024-10-18 | End: 2024-10-20 | Stop reason: HOSPADM

## 2024-10-18 RX ORDER — ACETAMINOPHEN 325 MG/1
975 TABLET ORAL EVERY 6 HOURS
Status: DISCONTINUED | OUTPATIENT
Start: 2024-10-18 | End: 2024-10-20 | Stop reason: HOSPADM

## 2024-10-18 RX ORDER — LIDOCAINE 560 MG/1
1 PATCH PERCUTANEOUS; TOPICAL; TRANSDERMAL
Status: DISCONTINUED | OUTPATIENT
Start: 2024-10-18 | End: 2024-10-20 | Stop reason: HOSPADM

## 2024-10-18 RX ORDER — IBUPROFEN 600 MG/1
600 TABLET ORAL EVERY 6 HOURS
Status: DISCONTINUED | OUTPATIENT
Start: 2024-10-18 | End: 2024-10-20 | Stop reason: HOSPADM

## 2024-10-18 RX ORDER — LOPERAMIDE HYDROCHLORIDE 2 MG/1
4 CAPSULE ORAL EVERY 2 HOUR PRN
Status: DISCONTINUED | OUTPATIENT
Start: 2024-10-18 | End: 2024-10-20 | Stop reason: HOSPADM

## 2024-10-18 RX ORDER — NIFEDIPINE 10 MG/1
10 CAPSULE ORAL ONCE AS NEEDED
Status: DISCONTINUED | OUTPATIENT
Start: 2024-10-18 | End: 2024-10-20 | Stop reason: HOSPADM

## 2024-10-18 RX ORDER — SIMETHICONE 80 MG
80 TABLET,CHEWABLE ORAL 4 TIMES DAILY PRN
Status: DISCONTINUED | OUTPATIENT
Start: 2024-10-18 | End: 2024-10-20 | Stop reason: HOSPADM

## 2024-10-18 RX ORDER — NORETHINDRONE AND ETHINYL ESTRADIOL 0.5-0.035
KIT ORAL
Status: COMPLETED
Start: 2024-10-18 | End: 2024-10-18

## 2024-10-18 RX ORDER — LABETALOL HYDROCHLORIDE 5 MG/ML
20 INJECTION, SOLUTION INTRAVENOUS ONCE AS NEEDED
Status: DISCONTINUED | OUTPATIENT
Start: 2024-10-18 | End: 2024-10-20 | Stop reason: HOSPADM

## 2024-10-18 RX ORDER — ONDANSETRON HYDROCHLORIDE 2 MG/ML
4 INJECTION, SOLUTION INTRAVENOUS EVERY 6 HOURS PRN
Status: DISCONTINUED | OUTPATIENT
Start: 2024-10-18 | End: 2024-10-20 | Stop reason: HOSPADM

## 2024-10-18 RX ORDER — POLYETHYLENE GLYCOL 3350 17 G/17G
17 POWDER, FOR SOLUTION ORAL 2 TIMES DAILY PRN
Status: DISCONTINUED | OUTPATIENT
Start: 2024-10-18 | End: 2024-10-20 | Stop reason: HOSPADM

## 2024-10-18 RX ORDER — OXYTOCIN/0.9 % SODIUM CHLORIDE 30/500 ML
2-30 PLASTIC BAG, INJECTION (ML) INTRAVENOUS CONTINUOUS
Status: DISCONTINUED | OUTPATIENT
Start: 2024-10-18 | End: 2024-10-18

## 2024-10-18 RX ORDER — CARBOPROST TROMETHAMINE 250 UG/ML
250 INJECTION, SOLUTION INTRAMUSCULAR ONCE AS NEEDED
Status: DISCONTINUED | OUTPATIENT
Start: 2024-10-18 | End: 2024-10-20 | Stop reason: HOSPADM

## 2024-10-18 RX ORDER — ONDANSETRON 4 MG/1
4 TABLET, FILM COATED ORAL EVERY 6 HOURS PRN
Status: DISCONTINUED | OUTPATIENT
Start: 2024-10-18 | End: 2024-10-20 | Stop reason: HOSPADM

## 2024-10-18 RX ORDER — NORETHINDRONE AND ETHINYL ESTRADIOL 0.5-0.035
KIT ORAL AS NEEDED
Status: DISCONTINUED | OUTPATIENT
Start: 2024-10-18 | End: 2024-10-18

## 2024-10-18 RX ORDER — HYDRALAZINE HYDROCHLORIDE 20 MG/ML
5 INJECTION INTRAMUSCULAR; INTRAVENOUS ONCE AS NEEDED
Status: DISCONTINUED | OUTPATIENT
Start: 2024-10-18 | End: 2024-10-20 | Stop reason: HOSPADM

## 2024-10-18 RX ORDER — OXYTOCIN/0.9 % SODIUM CHLORIDE 30/500 ML
60 PLASTIC BAG, INJECTION (ML) INTRAVENOUS ONCE AS NEEDED
Status: DISCONTINUED | OUTPATIENT
Start: 2024-10-18 | End: 2024-10-20 | Stop reason: HOSPADM

## 2024-10-18 RX ORDER — ADHESIVE BANDAGE
10 BANDAGE TOPICAL
Status: DISCONTINUED | OUTPATIENT
Start: 2024-10-18 | End: 2024-10-20 | Stop reason: HOSPADM

## 2024-10-18 RX ORDER — NALBUPHINE HYDROCHLORIDE 10 MG/ML
10 INJECTION INTRAMUSCULAR; INTRAVENOUS; SUBCUTANEOUS
Status: DISCONTINUED | OUTPATIENT
Start: 2024-10-18 | End: 2024-10-20 | Stop reason: HOSPADM

## 2024-10-18 RX ORDER — OXYTOCIN 10 [USP'U]/ML
10 INJECTION, SOLUTION INTRAMUSCULAR; INTRAVENOUS ONCE AS NEEDED
Status: DISCONTINUED | OUTPATIENT
Start: 2024-10-18 | End: 2024-10-20 | Stop reason: HOSPADM

## 2024-10-18 RX ORDER — DIPHENHYDRAMINE HCL 25 MG
25 TABLET ORAL EVERY 6 HOURS PRN
Status: DISCONTINUED | OUTPATIENT
Start: 2024-10-18 | End: 2024-10-20 | Stop reason: HOSPADM

## 2024-10-18 RX ORDER — FENTANYL/ROPIVACAINE/NS/PF 2MCG/ML-.2
0-25 PLASTIC BAG, INJECTION (ML) INJECTION CONTINUOUS
Status: DISCONTINUED | OUTPATIENT
Start: 2024-10-18 | End: 2024-10-18

## 2024-10-18 RX ORDER — BISACODYL 10 MG/1
10 SUPPOSITORY RECTAL DAILY PRN
Status: DISCONTINUED | OUTPATIENT
Start: 2024-10-18 | End: 2024-10-20 | Stop reason: HOSPADM

## 2024-10-18 RX ORDER — CALCIUM CARBONATE 200(500)MG
500 TABLET,CHEWABLE ORAL 4 TIMES DAILY PRN
Status: DISCONTINUED | OUTPATIENT
Start: 2024-10-18 | End: 2024-10-18

## 2024-10-18 SDOH — HEALTH STABILITY: MENTAL HEALTH: CURRENT SMOKER: 0

## 2024-10-18 ASSESSMENT — PAIN SCALES - GENERAL
PAINLEVEL_OUTOF10: 0 - NO PAIN
PAINLEVEL_OUTOF10: 5 - MODERATE PAIN
PAINLEVEL_OUTOF10: 0 - NO PAIN
PAINLEVEL_OUTOF10: 3
PAINLEVEL_OUTOF10: 3
PAINLEVEL_OUTOF10: 0 - NO PAIN
PAINLEVEL_OUTOF10: 6
PAINLEVEL_OUTOF10: 0 - NO PAIN
PAINLEVEL_OUTOF10: 0 - NO PAIN
PAINLEVEL_OUTOF10: 2
PAINLEVEL_OUTOF10: 0 - NO PAIN

## 2024-10-18 NOTE — PROGRESS NOTES
Intrapartum Progress Note    Assessment/Plan   Melissa Garcia is a 29 y.o.  at 38w1d. SURYA: 10/31/2024, Date entered prior to episode creation.     SROM  Continue pitocin per protocol  Ok for epidural    Assessment & Plan  Term pregnancy ([facility])    Pregnancy Problems (from 10/17/24 to present)       Problem Noted Diagnosed Resolved    Term pregnancy ([facility]) 10/17/2024 by Natalya Rodrigues MD  No    Priority:  Medium               Subjective   Thinks wants epidural    Objective   Last Vitals:  Temp Pulse Resp BP MAP Pulse Ox   36.9 °C (98.4 °F) 89 18 117/79 92 98 %     Vitals Min/Max Last 24 Hours:  Temp  Min: 36.3 °C (97.3 °F)  Max: 36.9 °C (98.5 °F)  Pulse  Min: 81  Max: 112  Resp  Min: 16  Max: 18  BP  Min: 102/60  Max: 133/86  MAP (mmHg)  Min: 76  Max: 105    Intake/Output:  No intake or output data in the 24 hours ending 10/18/24 1131    Physical Examination:  FHR is normal , with Accelerations, and a Category I tracing.    [place] readin-5   CERVIX: 1 cm dilated, 70 % effaced, -2 station; MEMBRANES are SROM - clear fluid    Lab Review:

## 2024-10-18 NOTE — CARE PLAN
Problem: Antepartum  Goal: Maintain pregnancy as long as maternal and/or fetal condition is stable  Outcome: Progressing  Goal: Avoid/minimize constipation  Outcome: Progressing  Goal: No decrease in circulation/VTE  Outcome: Progressing  Goal: FHR remains reassuring  Outcome: Progressing  Goal: Minimize anxiety/maximize coping  Outcome: Progressing     Problem: Vaginal Birth or  Section  Goal: Fetal and maternal status remain reassuring during the birth process  Outcome: Progressing  Goal: Tolerate CRB for IOL placement maintenance until dislodgement/removal 12hrs after placement  Outcome: Progressing  Goal: Prevention of malpresentation/labor dystocia through delivery  Outcome: Progressing  Goal: Demonstrates labor coping techniques through delivery  Outcome: Progressing  Goal: Minimal s/sx of HDP and BP<160/110  Outcome: Progressing     Problem: Nausea/Vomiting  Goal: Adequate urine output (0.5 ml/kg/hr)  Outcome: Progressing  Goal: Free from nausea/vomiting  Outcome: Progressing  Goal: Tolerates prescribed diet  Outcome: Progressing  Goal: Weight maintenance or gain  Outcome: Met  Goal: Achieve/maintain normal electrolyte level  Outcome: Met     Problem: Pain - Adult  Goal: Verbalizes/displays adequate comfort level or baseline comfort level  Outcome: Progressing     Problem: Safety - Adult  Goal: Free from fall injury  Outcome: Progressing     Problem: Discharge Planning  Goal: Discharge to home or other facility with appropriate resources  Outcome: Progressing    The patient's goals for the shift include  safe delivery of healthy baby girl, pain management throughout labor process.    The clinical goals for the shift include fetal and maternal status to remain reassuring throughout labor process, demonstrates labor coping techniques, adequate pain control with pharmacologic and non-pharmacologic interventions.

## 2024-10-18 NOTE — PROGRESS NOTES
Notified of patient having late decelerations, concurrent with maternal hypotension and associated nausea. Patient given fluid bolus, anesthesia updated and patient given ephedrine. Patient assessed at bedside, feeling symptomatically improved and FHT resumed Category 1. Continue pitocin per protocol.

## 2024-10-18 NOTE — ANESTHESIA PREPROCEDURE EVALUATION
Patient: Melissa Garcia    Evaluation Method: Chart review    Procedure Information    Date: 10/18/24  Procedure: Labor Consult         Relevant Problems   Anesthesia (within normal limits)      Cardiac (within normal limits)      Pulmonary (within normal limits)      Neuro   (+) Anxiety      GI   (+) Irritable bowel syndrome      /Renal (within normal limits)      Liver (within normal limits)      Endocrine (within normal limits)      Musculoskeletal (within normal limits)      HEENT (within normal limits)      Skin (within normal limits)      GYN   (+) 10 weeks gestation of pregnancy ([facility])       Clinical information reviewed:   Tobacco  Allergies  Meds   Med Hx  Surg Hx   Fam Hx          NPO Detail:  NPO/Void Status  Date of Last Liquid: 10/17/24  Time of Last Liquid: 1730  Date of Last Solid: 10/17/24  Time of Last Solid: 1730         OB/Gyn Evaluation    Present Pregnancy    Patient is pregnant now.   Obstetric History                PHYSICAL EXAM    Anesthesia Plan    History of general anesthesia?: yes  History of complications of general anesthesia?: no    ASA 2     epidural     Anesthetic plan and risks discussed with patient.    Plan discussed with CRNA.

## 2024-10-18 NOTE — ANESTHESIA PROCEDURE NOTES
Epidural Block    Patient location during procedure: OB  Start time: 10/18/2024 12:44 PM  End time: 10/18/2024 12:46 PM  Reason for block: labor analgesia  Staffing  Performed: CRNA   Authorized by: MARCUS Garcia    Performed by: MARCUS Garcia    Preanesthetic Checklist  Completed: patient identified, IV checked, risks and benefits discussed, surgical consent, pre-op evaluation, timeout performed and sterile techniques followed  Block Timeout  RN/Licensed healthcare professional reads aloud to the Anesthesia provider and entire team: Patient identity, procedure with side and site, patient position, and as applicable the availability of implants/special equipment/special requirements.  Patient on coagulant treatment: no  Timeout performed at: 10/18/2024 12:43 PM  Block Placement  Patient position: sitting  Prep: ChloraPrep  Sterility prep: hand, gloves, drape and cap  Patient monitoring: heart rate, continuous pulse oximetry and blood pressure  Approach: midline  Local numbing: lidocaine 1% to skin and subcutaneous tissues  Vertebral space: lumbar  Lumbar location: L3-L4  Epidural  Loss of resistance technique: saline  Guidance: landmark technique        Needle  Needle type: Tuohy   Needle gauge: 17  Needle length: 9 cm  Needle insertion depth: 6 cm  Catheter size: 19 G  Catheter at skin depth: 13 cm  Catheter securement method: clear occlusive dressing    Test dose: lidocaine 1.5% with epinephrine 1-to-200,000  Test dose: lidocaine 1.5% with epinephrine 1-to-200,000  Test dose result: no positive test dose    PCEA  Medication concentration used: 0.2% Ropivacaine with 2 mcg/mL Fentanyl  Dose (mL): 5  Lockout (minutes): 30  1-Hour Limit (boluses/hr): 2  Basal Rate: 10        Assessment  Sensory level: T10 bilateral  Block outcome: pain improved  Number of attempts: 1  Events: no positive test dose  Procedure assessment: patient tolerated procedure well with no immediate  complications

## 2024-10-18 NOTE — CARE PLAN
The patient's goals for the shift include      The clinical goals for the shift include deliver baby    Problem: Antepartum  Goal: Maintain pregnancy as long as maternal and/or fetal condition is stable  Outcome: Progressing  Goal: Avoid/minimize constipation  Outcome: Progressing  Goal: No decrease in circulation/VTE  Outcome: Progressing  Goal: FHR remains reassuring  Outcome: Progressing  Goal: Minimize anxiety/maximize coping  Outcome: Progressing     Problem: Vaginal Birth or  Section  Goal: Fetal and maternal status remain reassuring during the birth process  Outcome: Progressing  Goal: Prevention of malpresentation/labor dystocia through delivery  Outcome: Progressing  Goal: Demonstrates labor coping techniques through delivery  Outcome: Progressing  Goal: Minimal s/sx of HDP and BP<160/110  Outcome: Progressing  Goal: No s/sx of infection through recovery  Outcome: Progressing  Goal: No s/sx of hemorrhage through recovery  Outcome: Progressing     Problem: Postpartum  Goal: Experiences normal postpartum course  Outcome: Progressing  Goal: Appropriate maternal -  bonding  Outcome: Progressing  Goal: Establish and maintain infant feeding pattern for adequate nutrition  Outcome: Progressing  Goal: Incisions, wounds, or drain sites healing without S/S of infection  Outcome: Progressing  Goal: No s/sx infection  Outcome: Progressing  Goal: No s/sx of hemorrhage  Outcome: Progressing  Goal: Minimal s/sx of HDP and BP<160/110  Outcome: Progressing     Problem: Nausea/Vomiting  Goal: Adequate urine output (0.5 ml/kg/hr)  Outcome: Progressing  Goal: Free from nausea/vomiting  Outcome: Progressing  Goal: Tolerates prescribed diet  Outcome: Progressing     Problem: Infection  Goal: Fever/diaphoresis will improve to <38.0 C  Outcome: Progressing  Goal: Wound will have less exudate and warmth  Outcome: Progressing  Goal: Improvement in s/sx of infection  Outcome: Progressing     Problem: UH VAGINAL  BLEEDING/HEMORRHAGE AP  Goal: Fewer then 4-6 ct per hour  Outcome: Progressing  Goal: No s/sx of hemorrhage  Outcome: Progressing     Problem: Postpartum hemorrhage  Goal: Hemodynamic stability and limit blood loss  Outcome: Progressing     Problem: Pain - Adult  Goal: Verbalizes/displays adequate comfort level or baseline comfort level  Outcome: Progressing     Problem: Safety - Adult  Goal: Free from fall injury  Outcome: Progressing     Problem: Discharge Planning  Goal: Discharge to home or other facility with appropriate resources  Outcome: Progressing

## 2024-10-18 NOTE — H&P
" OB Admission H&P    Assessment/Plan    Melissa Garcia is a 29 y.o.  at 38w0d who is admitted for Labor    IUGR  Fetal surveillance antepartum with Induction today    CF carrier - FOB not tested    Thyroid disease -     RSV+ - 2024    Ocular headaches - consider CT scan of head postpartum        Plan:    -Admit to L&D, consented  -T&S, CBC, and Syphilis  -Epidural at patient request  -Recheck as clinically indicated by maternal or fetal status    Fetal Status  -NST reactive, reassuring   -Presentation VTX based on ultrasound and vaginal exam  -EFW 6'1oz, 17.2%, AC-9.3%/BPD-5.9%/FL-11.1%  by US  -GBS negative    Postpartum:   -Contraception Plan: patient declined    Pregnancy Problems (from 10/17/24 to present)       Problem Noted Diagnosed Resolved    Term pregnancy ([facility]) 10/17/2024 by Natalya Rodrigues MD  No    Priority:  Medium               Subjective   Good fetal movement.  Denies vaginal bleeding., Denies leaking of fluid.      Prenatal Provider [facility]    OB History    Para Term  AB Living   1 0 0 0 0 0   SAB IAB Ectopic Multiple Live Births   0 0 0 0 0      # Outcome Date GA Lbr Ilir/2nd Weight Sex Type Anes PTL Lv   1 Current                Past Surgical History:   Procedure Laterality Date    SHOULDER SURGERY  2017    Shoulder Surgery       Social History     Tobacco Use    Smoking status: Never    Smokeless tobacco: Never   Substance Use Topics    Alcohol use: Not Currently       Allergies   Allergen Reactions    Adhesive Other     \"Intolerance    Latex Itching       Medications Prior to Admission   Medication Sig Dispense Refill Last Dose/Taking    EPINEPHrine 0.3 mg/0.3 mL injection syringe Inject 0.3 mL (0.3 mg) into the muscle. AS DIRECTED.       loratadine (Claritin) 10 mg tablet Take 1 tablet (10 mg) by mouth once daily.       PNV Comb12/Iron Cb/FA1/DSS/DHA (PRENATAL 12-IRON-FA1-DSS-DHA ORAL) Take 1 tablet by mouth once daily.        Objective "     Last Vitals  Temp Pulse Resp BP MAP O2 Sat   36.8 °C (98.2 °F) 93 16 123/79 96 96 %     Blood Pressures         10/17/2024  2023 10/17/2024  2025          BP: 123/79 123/79               Physical Exam  General: NAD, mood appropriate  Cardiopulmonary: warm and well perfused, breathing comfortably on room air  Abdomen: Gravid, non-tender  Extremities: Symmetric  Speculum Exam: not indicated due to gross rupture of membranes, deferred  Cervix: Fingertip /50 /-2      Fetal Monitoring  Baseline: normal bpm, Variability: moderate,  Accelerations: present and Decelerations: none  Uterine Activity: Irregular contractions  Interpretation: Reactive    Bedside ultrasound: No    Labs in chart were reviewed.  0   Lab Value Date/Time    GRPBSTREP No Group B Streptococcus (GBS) isolated 09/30/2024 0837     CBC             Prenatal labs reviewed, not remarkable.

## 2024-10-18 NOTE — PROGRESS NOTES
Notified 9-10cm on RN exam. FHT with rare variable deceleration, overall reassuring with moderate variability. Continue pitocin per protocol.

## 2024-10-18 NOTE — PROGRESS NOTES
Intrapartum Progress Note    Assessment/Plan   Melissa Garcia is a 29 y.o.  at 38w1d. SURYA: 10/31/2024, Date entered prior to episode creation.     Pitocin per protocol at 0930am, today    Assessment & Plan  Term pregnancy ([facility])    Pregnancy Problems (from 10/17/24 to present)       Problem Noted Diagnosed Resolved    Term pregnancy ([facility]) 10/17/2024 by Natalya Rodrigues MD  No    Priority:  Medium               Subjective   Doing well    Objective   Last Vitals:  Temp Pulse Resp BP MAP Pulse Ox   36.9 °C (98.5 °F) 91 18 121/84 99 100 %     Vitals Min/Max Last 24 Hours:  Temp  Min: 36.3 °C (97.3 °F)  Max: 36.9 °C (98.5 °F)  Pulse  Min: 81  Max: 112  Resp  Min: 16  Max: 18  BP  Min: 102/60  Max: 133/86  MAP (mmHg)  Min: 76  Max: 105    Intake/Output:  No intake or output data in the 24 hours ending 10/18/24 0812    Physical Examination:  FHR is  , with Accelerations, and a Category I tracing.    [place] reading:    CERVIX: 1 cm dilated, 70 % effaced, -2 station; MEMBRANES are intact     Lab Review:  Lab Results   Component Value Date    ABO B 10/17/2024    LABRH POS 10/17/2024    ABSCRN NEG 10/17/2024     Lab Results   Component Value Date    WBC 15.7 (H) 10/17/2024    HGB 12.1 10/17/2024    HCT 37.2 10/17/2024     10/17/2024     0   Lab Value Date/Time    GRPBSTREP No Group B Streptococcus (GBS) isolated 2024 0837

## 2024-10-18 NOTE — PROGRESS NOTES
Intrapartum Progress Note    Assessment/Plan   Melissa Garcia is a 29 y.o.  at 38w1d. SURYA: 10/31/2024, Date entered prior to episode creation.     Plan to discontinue carmen and initiate pushing efforts. Continue pitocin per protocol.     Assessment & Plan  Term pregnancy ([facility])    Pregnancy Problems (from 10/17/24 to present)       Problem Noted Diagnosed Resolved    Term pregnancy ([facility]) 10/17/2024 by Natalya Rodrigues MD  No    Priority:  Medium               Subjective   Feeling pressure.     Objective   Last Vitals:  Temp Pulse Resp BP MAP Pulse Ox   36.8 °C (98.2 °F) 104 18 128/80 97 98 %     Vitals Min/Max Last 24 Hours:  Temp  Min: 36.3 °C (97.3 °F)  Max: 37 °C (98.6 °F)  Pulse  Min: 78  Max: 118  Resp  Min: 16  Max: 18  BP  Min: 93/57  Max: 138/81  MAP (mmHg)  Min: 70  Max: 105    Physical Examination:  AAOx3, NAD  Abd soft, gravid, NT  SVE = 10/100/+1    FHT = Category 2 -- intermittent variable decelerations, moderate variability and +acceleration present  [employer] = q3min

## 2024-10-18 NOTE — ANESTHESIA PREPROCEDURE EVALUATION
Patient: Melissa Garcia    Evaluation Method: In-person visit    Procedure Information    Date: 10/18/24  Procedure: Labor Analgesia         Relevant Problems   Neuro   (+) Anxiety      GI   (+) Irritable bowel syndrome      GYN   (+) 10 weeks gestation of pregnancy ([facility])       Clinical information reviewed:   Tobacco  Allergies  Meds   Med Hx  Surg Hx   Fam Hx          NPO Detail:  NPO/Void Status  Date of Last Liquid: 10/17/24  Time of Last Liquid: 1730  Date of Last Solid: 10/17/24  Time of Last Solid: 1730         OB/GYN     Physical Exam    Airway  Mallampati: II     Cardiovascular - normal exam     Dental - normal exam     Pulmonary - normal exam     Abdominal - normal exam             Anesthesia Plan    History of general anesthesia?: yes  History of complications of general anesthesia?: no    ASA 2     epidural     The patient is not a current smoker.    Anesthetic plan and risks discussed with patient.

## 2024-10-18 NOTE — CARE PLAN
The patient's goals for the shift include      The clinical goals for the shift include Safe delivery      Problem: Antepartum  Goal: Maintain pregnancy as long as maternal and/or fetal condition is stable  Outcome: Progressing  Goal: Avoid/minimize constipation  Outcome: Progressing  Goal: No decrease in circulation/VTE  Outcome: Progressing  Goal: FHR remains reassuring  Outcome: Progressing  Goal: Minimize anxiety/maximize coping  Outcome: Progressing     Problem: Vaginal Birth or  Section  Goal: Fetal and maternal status remain reassuring during the birth process  Outcome: Progressing  Goal: Tolerate CRB for IOL placement maintenance until dislodgement/removal 12hrs after placement  Outcome: Progressing  Goal: Prevention of malpresentation/labor dystocia through delivery  Outcome: Progressing  Goal: Demonstrates labor coping techniques through delivery  Outcome: Progressing  Goal: Minimal s/sx of HDP and BP<160/110  Outcome: Progressing  Goal: No s/sx of infection through recovery  Outcome: Progressing  Goal: No s/sx of hemorrhage through recovery  Outcome: Progressing     Problem: Postpartum  Goal: Experiences normal postpartum course  Outcome: Progressing  Goal: Appropriate maternal -  bonding  Outcome: Progressing  Goal: Establish and maintain infant feeding pattern for adequate nutrition  Outcome: Progressing  Goal: Incisions, wounds, or drain sites healing without S/S of infection  Outcome: Progressing  Goal: No s/sx infection  Outcome: Progressing  Goal: No s/sx of hemorrhage  Outcome: Progressing  Goal: Minimal s/sx of HDP and BP<160/110  Outcome: Progressing     Problem: Nausea/Vomiting  Goal: Adequate urine output (0.5 ml/kg/hr)  Outcome: Progressing  Goal: Free from nausea/vomiting  Outcome: Progressing  Goal: Tolerates prescribed diet  Outcome: Progressing  Goal: Weight maintenance or gain  Outcome: Progressing  Goal: Achieve/maintain normal electrolyte level  Outcome: Progressing      Problem: Infection  Goal: Fever/diaphoresis will improve to <38.0 C  Outcome: Progressing  Goal: Wound will have less exudate and warmth  Outcome: Progressing  Goal: Improvement in s/sx of infection  Outcome: Progressing     Problem: UH VAGINAL BLEEDING/HEMORRHAGE AP  Goal: Fewer then 4-6 ct per hour  Outcome: Progressing  Goal: No s/sx of hemorrhage  Outcome: Progressing     Problem: Postpartum hemorrhage  Goal: Hemodynamic stability and limit blood loss  Outcome: Progressing     Problem: Pain - Adult  Goal: Verbalizes/displays adequate comfort level or baseline comfort level  Outcome: Progressing     Problem: Safety - Adult  Goal: Free from fall injury  Outcome: Progressing     Problem: Discharge Planning  Goal: Discharge to home or other facility with appropriate resources  Outcome: Progressing

## 2024-10-19 PROCEDURE — 1220000001 HC OB SEMI-PRIVATE ROOM DAILY

## 2024-10-19 PROCEDURE — 2500000001 HC RX 250 WO HCPCS SELF ADMINISTERED DRUGS (ALT 637 FOR MEDICARE OP): Performed by: STUDENT IN AN ORGANIZED HEALTH CARE EDUCATION/TRAINING PROGRAM

## 2024-10-19 ASSESSMENT — PAIN SCALES - GENERAL
PAINLEVEL_OUTOF10: 0 - NO PAIN
PAINLEVEL_OUTOF10: 0 - NO PAIN
PAINLEVEL_OUTOF10: 1
PAINLEVEL_OUTOF10: 0 - NO PAIN
PAINLEVEL_OUTOF10: 3
PAIN_LEVEL: 3
PAINLEVEL_OUTOF10: 0 - NO PAIN

## 2024-10-19 NOTE — ANESTHESIA POSTPROCEDURE EVALUATION
Patient: Melissa Garcia    Procedure Summary       Date: 10/18/24 Room / Location:     Anesthesia Start: 1244 Anesthesia Stop: 2019    Procedure: Labor Analgesia Diagnosis:     Scheduled Providers:  Responsible Provider: MARCUS Garcia    Anesthesia Type: epidural ASA Status: 2            Anesthesia Type: epidural    Vitals Value Taken Time     Vitals:    10/19/24 0417   BP: 109/67   Pulse: 93   Resp:    Temp:    SpO2: (!) 94%       Anesthesia Post Evaluation    Patient location during evaluation: bedside  Patient participation: complete - patient participated  Level of consciousness: awake and alert  Pain score: 3  Pain management: adequate  Airway patency: patent  Cardiovascular status: acceptable  Respiratory status: acceptable  Hydration status: acceptable  Postoperative Nausea and Vomiting: none        No notable events documented.

## 2024-10-19 NOTE — PROGRESS NOTES
Postpartum Progress Note    Assessment/Plan   Melissa Garcia is a 29 y.o., , who delivered at 38w1d gestation and is now postpartum day 1.    PPD#1 s/p . Doing well, continue routine postpartum care.       Assessment & Plan  Term pregnancy ([facility])    Pregnancy Problems (from 10/17/24 to present)       Problem Noted Diagnosed Resolved    Term pregnancy ([facility]) 10/17/2024 by Natalya Rodrigues MD  No    Priority:  Medium               Subjective   Sitting up on couch eating breakfast. No acute issues overnight. Pain controlled. Mild lochia. Tolerating PO. Ambulating.     Objective   Allergies:   Adhesive and Latex         Last Vitals:  Temp Pulse Resp BP MAP Pulse Ox   36.6 °C (97.9 °F) 99 18 118/82 96 97 %     Vitals Min/Max Last 24 Hours:  Temp  Min: 36.6 °C (97.9 °F)  Max: 37 °C (98.6 °F)  Pulse  Min: 53  Max: 153  Resp  Min: 16  Max: 18  BP  Min: 93/57  Max: 138/81  MAP (mmHg)  Min: 70  Max: 104    Physical Exam:  AAOx3, NAD  CTAB, RRR  Abd soft, NT, mildly distended, fundus firm, midline  Ext 1+ edema BLE

## 2024-10-19 NOTE — L&D DELIVERY NOTE
OB Delivery Note  10/18/2024  Melissa Garcia  29 y.o.   Vaginal, Spontaneous       Gestational Age: 38w1d  /Para:   Quantitative Blood Loss: Admission to Discharge: 550 mL (10/17/2024  7:53 PM - 10/18/2024 11:02 PM)    Girish Garcia [[id number]]      Labor Events    Rupture date/time: 10/18/2024 1110  Rupture type: Spontaneous  Fluid color: Clear  Fluid odor: None  Labor type: Induced Onset of Labor  Labor allowed to proceed with plans for an attempted vaginal birth?: Yes  Induction: Misoprostol, Oxytocin  First cervical ripening date/time: 10/17/2024 2130  Induction date/time: 10/17/2024 2000  Induction indications: Other  Complications: None       Labor Event Times    Labor onset date/time: 10/17/2024 2130  Dilation complete date/time: 10/18/2024 1920  Start pushing date/time: 10/18/2024 1950       Labor Length    1st stage: 21h 50m  2nd stage: 0h 59m  3rd stage: 0h 05m       Placenta    Placenta delivery date/time: 10/18/2024 2024  Placenta removal: Expressed  Placenta appearance: Intact  Placenta disposition: pathology       Cord    Vessels: 3 vessels  Complications: Nuchal  Nuchal intervention: reduced  Nuchal cord description: loose nuchal cord  Number of loops: 1  Delayed cord clamping?: Yes  Cord blood disposition: Discarded  Gases sent?: No  Stem cell collection (by provider): No       Lacerations    Perineal laceration: 2nd  Perineal laceration repaired?: Yes  Periurethral laceration?: Yes  Periurethral laceration location: right  Periurethral laceration repaired?: Yes       Anesthesia    Method: Epidural       Operative Delivery    Forceps attempted?: No  Vacuum extractor attempted?: No       Shoulder Dystocia    Shoulder dystocia present?: No       [place] Delivery    Time head delivered: 10/18/2024 20:18:00  Birth date/time: 10/18/2024 20:19:00  Delivery type: Vaginal, Spontaneous  Complications: None       Resuscitation    Method: Tactile stimulation       Apgars    Living  status: Living  Apgar Component Scores:  1 min.:  5 min.:  10 min.:  15 min.:  20 min.:    Skin color:  1  1       Heart rate:  2  2       Reflex irritability:  2  2       Muscle tone:  2  2       Respiratory effort:  2  2       Total:  9  9       Apgars assigned by: DAPHNE SPEARS RN       Delivery Providers    Delivering clinician: Shawnee Lopez MD   Provider Role    Geno Shiplye RN Delivery Nurse    Kelle Spears RN Nursery Nurse               Induction of labor for FGR.     At patient bedside for delivery. Delivery of fetal head with maternal valsalva. Loose nuchal x1, easily reduced. Anterior shoulder and rest of infant easily followed. Infant placed on maternal abdomen. Cord clamped and cut. Placenta delivered with gentle traction, intact on inspection. IV Pitocin initiated to facilitate uterine contraction. Survey of perineum revealed second degree perineal laceration and right periurethral laceration, repaired in usual fashion. Fundus firm with scant bleeding at conclusion.       Shawnee Lopez MD

## 2024-10-20 VITALS
DIASTOLIC BLOOD PRESSURE: 74 MMHG | HEIGHT: 66 IN | TEMPERATURE: 97.9 F | RESPIRATION RATE: 18 BRPM | SYSTOLIC BLOOD PRESSURE: 119 MMHG | BODY MASS INDEX: 28.99 KG/M2 | OXYGEN SATURATION: 97 % | WEIGHT: 180.4 LBS | HEART RATE: 85 BPM

## 2024-10-20 PROBLEM — Z3A.10 10 WEEKS GESTATION OF PREGNANCY (HHS-HCC): Status: RESOLVED | Noted: 2024-04-01 | Resolved: 2024-10-20

## 2024-10-20 PROBLEM — Z34.90 TERM PREGNANCY (HHS-HCC): Status: RESOLVED | Noted: 2024-10-17 | Resolved: 2024-10-20

## 2024-10-20 PROCEDURE — 2500000001 HC RX 250 WO HCPCS SELF ADMINISTERED DRUGS (ALT 637 FOR MEDICARE OP): Performed by: STUDENT IN AN ORGANIZED HEALTH CARE EDUCATION/TRAINING PROGRAM

## 2024-10-20 PROCEDURE — 2500000004 HC RX 250 GENERAL PHARMACY W/ HCPCS (ALT 636 FOR OP/ED): Performed by: STUDENT IN AN ORGANIZED HEALTH CARE EDUCATION/TRAINING PROGRAM

## 2024-10-20 PROCEDURE — 90707 MMR VACCINE SC: CPT | Performed by: STUDENT IN AN ORGANIZED HEALTH CARE EDUCATION/TRAINING PROGRAM

## 2024-10-20 PROCEDURE — 90471 IMMUNIZATION ADMIN: CPT | Performed by: STUDENT IN AN ORGANIZED HEALTH CARE EDUCATION/TRAINING PROGRAM

## 2024-10-20 RX ORDER — IBUPROFEN 600 MG/1
600 TABLET ORAL EVERY 6 HOURS
Qty: 30 TABLET | Refills: 0 | Status: SHIPPED | OUTPATIENT
Start: 2024-10-20

## 2024-10-20 ASSESSMENT — PAIN SCALES - GENERAL
PAINLEVEL_OUTOF10: 0 - NO PAIN

## 2024-10-20 NOTE — PROGRESS NOTES
Postpartum Progress Note    Assessment/Plan   Melissa Garcia is a 29 y.o., , who delivered at 38w1d gestation and is now postpartum day 2.    PPD#2 s/p . Doing well, stable for discharge home. Homegoing restrictions and followup reviewed.       Assessment & Plan  Term pregnancy ([facility])    Pregnancy Problems (from 10/17/24 to present)       Problem Noted Diagnosed Resolved    Term pregnancy ([facility]) 10/17/2024 by Natalya Rodrigues MD  No    Priority:  Medium             Hospital course: no complications    Subjective   No acute issues overnight. Pain controlled. Mild lochia. Tolerating PO. Ambulating. Desires discharge home.       Objective   Allergies:   Adhesive and Latex         Last Vitals:  Temp Pulse Resp BP MAP Pulse Ox   36.6 °C (97.9 °F) 85 18 119/74 91 97 %     Vitals Min/Max Last 24 Hours:  Temp  Min: 36.5 °C (97.7 °F)  Max: 36.9 °C (98.4 °F)  Pulse  Min: 78  Max: 97  Resp  Min: 16  Max: 20  BP  Min: 114/76  Max: 136/89  MAP (mmHg)  Min: 89  Max: 107    Physical Exam:  AAOx3, NAD  CTAB, RRR  Abd soft, NT, mildly distended, fundus firm, midline  Ext 1+ edema BLE

## 2024-10-20 NOTE — DISCHARGE SUMMARY
Discharge Summary    Admission Date: 10/17/2024  Discharge Date: 10/20/2024     Discharge Diagnosis  Term pregnancy ([facility])    Hospital Course  Delivery Date: 10/18/2024 8:19 PM  Delivery type: Vaginal, Spontaneous   GA at delivery: 38w1d  Outcome: Living  Anesthesia during delivery: Epidural  Intrapartum complications: None  Feeding method: Breastfeeding Status: Yes  Procedures: none    Pertinent Physical Exam At Time of Discharge  AAOx3, NAD  CTAB, RRR  Abd soft, NT, mildly distended, fundus firm, midline  Ext 1+ edema BLE      Last Vitals:  Temp Pulse Resp BP MAP Pulse Ox   36.6 °C (97.9 °F) 85 18 119/74 91 97 %     Discharge Meds     Your medication list        START taking these medications        Instructions Last Dose Given Next Dose Due   ibuprofen 600 mg tablet      Take 1 tablet (600 mg) by mouth every 6 hours.              CONTINUE taking these medications        Instructions Last Dose Given Next Dose Due   Claritin 10 mg tablet  Generic drug: loratadine           EPINEPHrine 0.3 mg/0.3 mL injection syringe  Commonly known as: Epipen           PRENATAL [id number] ORAL                     Where to Get Your Medications        These medications were sent to [facility]  [address], [address], [place] [address]      Hours: 9 AM to 6 PM Mon-Fri, 9 AM to 1 PM Sat Phone: [phone]   ibuprofen 600 mg tablet          Complications Requiring Follow-Up  None    Test Results Pending At Discharge  Pending Labs       Order Current Status    Surgical Pathology Exam - PLACENTA Collected (10/18/24 8463)            Outpatient Follow-Up  Future Appointments   Date Time Provider Department Center   11/18/2024  1:45 PM DO ULISSES ALLERGY LEXY SUMMERS MA [id number]     Postpartum visit in 6 weeks      Shawnee Lopez MD

## 2024-10-20 NOTE — CARE PLAN
The patient's goals for the shift include      The clinical goals for the shift include No pain      Problem: Postpartum  Goal: Experiences normal postpartum course  Outcome: Progressing  Goal: Appropriate maternal -  bonding  Outcome: Progressing  Goal: Establish and maintain infant feeding pattern for adequate nutrition  Outcome: Progressing  Goal: Incisions, wounds, or drain sites healing without S/S of infection  Outcome: Progressing  Goal: No s/sx infection  Outcome: Progressing  Goal: No s/sx of hemorrhage  Outcome: Progressing  Goal: Minimal s/sx of HDP and BP<160/110  Outcome: Progressing

## 2024-10-21 ENCOUNTER — APPOINTMENT (OUTPATIENT)
Dept: ALLERGY | Facility: CLINIC | Age: 29
End: 2024-10-21
Payer: COMMERCIAL

## 2024-11-01 ENCOUNTER — LACTATION CONSULT (OUTPATIENT)
Dept: LACTATION | Facility: HOSPITAL | Age: 29
End: 2024-11-01
Payer: COMMERCIAL

## 2024-11-01 DIAGNOSIS — O92.70 LACTATION DISORDER (HHS-HCC): Primary | ICD-10-CM

## 2024-11-06 LAB
LABORATORY COMMENT REPORT: NORMAL
PATH REPORT.COMMENTS IMP SPEC: NORMAL
PATH REPORT.FINAL DX SPEC: NORMAL
PATH REPORT.GROSS SPEC: NORMAL
PATH REPORT.RELEVANT HX SPEC: NORMAL
PATH REPORT.TOTAL CANCER: NORMAL

## 2024-11-12 ENCOUNTER — LACTATION CONSULT (OUTPATIENT)
Dept: LACTATION | Facility: HOSPITAL | Age: 29
End: 2024-11-12
Payer: COMMERCIAL

## 2024-11-12 DIAGNOSIS — O92.70 LACTATION DISORDER (HHS-HCC): Primary | ICD-10-CM

## 2024-11-18 ENCOUNTER — APPOINTMENT (OUTPATIENT)
Dept: ALLERGY | Facility: CLINIC | Age: 29
End: 2024-11-18
Payer: COMMERCIAL

## 2024-11-18 ENCOUNTER — LACTATION CONSULT (OUTPATIENT)
Dept: LACTATION | Facility: HOSPITAL | Age: 29
End: 2024-11-18
Payer: COMMERCIAL

## 2024-11-18 DIAGNOSIS — O92.70 LACTATION DISORDER (HHS-HCC): Primary | ICD-10-CM

## 2024-11-18 DIAGNOSIS — J30.89 ALLERGIC RHINITIS DUE TO OTHER ALLERGIC TRIGGER, UNSPECIFIED SEASONALITY: ICD-10-CM

## 2024-11-18 PROCEDURE — 95117 IMMUNOTHERAPY INJECTIONS: CPT | Performed by: ALLERGY & IMMUNOLOGY

## 2024-11-18 NOTE — PROGRESS NOTES
Melissa is a 29 year old patient here for follow up lactation assistance, support and education. Baby Mallorie is s/p tongue and lip tie release on 11/15/24 by Pediatric Dentistry. Melissa would like to work on positioning and latch techniques and assess current weight and milk transfer.     Date/Time of Consult: 24 @ 0930  G/P:    OB physician: Dr. Rodrigues (delivered at [facility])  Breastfeeding Experience: n/a  Current Breastfeeding Goals: at least 6 months but as long as possible    Reason for Consult: latch/weight check, s/p tongue and lip tie release last Friday    Patient's Concerns:   -discomfort with feedings  -red/sore nipples  -nipples are usually creased after feedings  -long feedings  -cluster feeding  -cobblestone blister appearance in baby's upper lip  -choking at times during feeding, possible oversupply and forceful milk ejection reflex  __________________________________________________________________________________  Maternal Health Hx/Risk Factors: was induced at 38 weeks for IUGR, states her placenta showed chronic inflammation and decreased vascularity     Delivery Complications: denies    Support System: , Aydin, also states her mother is a big help and spends the night to assist as needed  Return to Work? 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  __________________________________________________________________________________  Infant's Name: Mallorie  Infant's Gender: Fe/male  Infant's : 10/18/24  Gestation at Birth: 38.1 weeks  Delivery Method: vaginal    Complications: only concern was jaundice, supplemented briefly while in the hospital and at home, no longer supplementing with infant formula  Birth Weight: 2730 grams  Hospital Discharge Weight: 2500 grams    Current Age: (days, wks, mos, yrs): 1 month    Most Current Weight/Date Taken: 7 lbs 3 oz (3255 grams) on 24 at last outpatient lactation appt    *Reports above adequate feeding  "frequency and output   ___________________________________________________________________________________  Pump type: Spectra  Pumping frequency: typically once per day when baby gets her practice bottle from   Pumping volume: 5-6 oz each session  Current Flange Size: is unsure, education provided on proper sizing/fit  Pain with Pumping?  Denies pain, reports soreness/tenderness due to redness and shallow latches when direct breastfeeding  Skin Breakdown with Pumping? denies    Began practice bottles? (Y/N) if so, at what age: supplemented at birth, continues to practice once per day    ____________________________________________________________________________________  Time of Last Feedin  Pre-feeding Weight: 3540 grams (7 lbs 13 oz)  Active minutes on first breast: 18  Post-feed Wt first breast: 3580 grams   Milk transfer first breast: 40 mls    Active minutes on second breast: 9  Post-feed Wt second breast: 3615 grams  Milk transfer second breast: 35 mls  Total Milk Transfer: 75 mls ( 2.5 oz.)    Feeding Assessment-  Breast Assessment: medium, compressible  Nipple Assessment: red, sore, slightly creased appearance before feeding (reports feeding baby at 0800 this morning)  Latch (shallow, deep): initially allows baby to slurp her way on to a shallow latch, states she \"doesn't like to hold her neck or head\", nipple noted to be slipping in and out of infant's mouth, Melissa reports discomfort, assisted to achieve more optimal positioning and deep latch techniques  Suck (stimulation needed, active, rhythmic, coordinated): active, rhythmic, coordinated  Swallowing (none, a few, intermittent, frequent): frequent audible swallowing   Quality (poor, fair, good, excellent): good, still needs work on positioning and deep latch, will follow up next week with LC    *Rates initial discomfort upon latch as a 3/10, reports it subsides to a 1/10 on right breast and a 0/10 on the left breast, reports this is a " "significant improvement  Nipple appearance after feeding: slightly creased but much better per patient    ~~~~~~~~~~~~~~~~~~~~~~~~~~~~~~~~~~~~~~~~~  Next Pediatrician Appointment Scheduled for: tomorrow for 1 month appt  Recent or up-coming appts/consults: (Lactation, ENT, Peds Dentistry, Chiropractor, etc.): tongue and lip tie release done last Friday, November 15th. Will follow up with them on 11/27/24.    Education Topics Covered During Consult: positioning, deep latch, tongue and lip ties, milk supply, oversupply, forceful milk ejection reflex, pumping, practice bottles, return to work, mental health and wellness, follow up    Education Handouts Provided: PI Sheets provided on: Milk Supply and Nursing Your Baby (positioning and deep latch techniques)    Recommendation/Summary: Melissa reports significant improvement in latch and comfort since baby's tongue and lip tie release. She is still struggling with comfort level of supporting infant's neck/shoulders and bringing her to the breast quickly and chin first. States \"I'm afraid to hurt her\". Offered support and encouragement. Assisted with positioning and deep latch techniques. Deeper and sustained latch achieved with active suck and frequent swallowing. Reinforced importance of that rapid arm movement to bring infant to the breast quickly and closely. Discussed potential risk of consistent shallow latch resulting in painful feedings and lower milk supply. Melissa verbalizes understanding. Plans to continue working on positioning and latch techniques. Will follow up with LC next week.     Reviewed tips to assist with forceful letdown. Melissa has tongue/sucking exercises from pediatric dentist that they are working on with kyle Nobles. Melissa will see Pediatrician tomorrow, LC next Tuesday and Peds Dentistry next Wednesday. Has phone numbers to call with questions and concerns.     Verbalizes increased confidence and comfort after today's consult. " States feedings feel a lot better and nipples are looking a lot better as well. Denies any further questions or concerns at this time.

## 2024-11-21 ENCOUNTER — APPOINTMENT (OUTPATIENT)
Dept: PRIMARY CARE | Facility: CLINIC | Age: 29
End: 2024-11-21
Payer: COMMERCIAL

## 2024-11-21 VITALS
DIASTOLIC BLOOD PRESSURE: 70 MMHG | SYSTOLIC BLOOD PRESSURE: 100 MMHG | BODY MASS INDEX: 25.99 KG/M2 | HEART RATE: 73 BPM | WEIGHT: 161 LBS | OXYGEN SATURATION: 98 % | TEMPERATURE: 97.3 F

## 2024-11-21 DIAGNOSIS — Z00.00 ROUTINE GENERAL MEDICAL EXAMINATION AT A HEALTH CARE FACILITY: ICD-10-CM

## 2024-11-21 DIAGNOSIS — Z23 FLU VACCINE NEED: Primary | ICD-10-CM

## 2024-11-21 PROCEDURE — 90471 IMMUNIZATION ADMIN: CPT | Performed by: FAMILY MEDICINE

## 2024-11-21 PROCEDURE — 99395 PREV VISIT EST AGE 18-39: CPT | Performed by: FAMILY MEDICINE

## 2024-11-21 PROCEDURE — 90656 IIV3 VACC NO PRSV 0.5 ML IM: CPT | Performed by: FAMILY MEDICINE

## 2024-11-21 ASSESSMENT — ENCOUNTER SYMPTOMS
DIZZINESS: 0
NAUSEA: 0
SHORTNESS OF BREATH: 0
PALPITATIONS: 0
FEVER: 0
FATIGUE: 0
FREQUENCY: 0
DYSURIA: 0
BLOOD IN STOOL: 0
DECREASED CONCENTRATION: 0
DIARRHEA: 0
COUGH: 0
HEMATURIA: 0
EYE PAIN: 0
HEADACHES: 0
HALLUCINATIONS: 0
TROUBLE SWALLOWING: 0
NUMBNESS: 0
UNEXPECTED WEIGHT CHANGE: 0
JOINT SWELLING: 0
SORE THROAT: 0
VOMITING: 0
CONFUSION: 0
WEAKNESS: 0
ABDOMINAL PAIN: 0

## 2024-11-21 ASSESSMENT — PAIN SCALES - GENERAL: PAINLEVEL_OUTOF10: 0-NO PAIN

## 2024-11-21 NOTE — PATIENT INSTRUCTIONS
It was nice to see you today!  Discussed current concerns and addressed   Reviewed recent labs and diagnostics  Reviewed medications list  Continue to eat a healthy diet, exercise at least 3 times a week or more  Plan and follow up discussed  For any further information related to your condition, copy and paste or go to [email or web address]  We will check thyroid hormone due to physical findings and history of Hashimoto's

## 2024-11-21 NOTE — PROGRESS NOTES
Subjective   Patient ID: Melissa Garcia is a 29 y.o. female.    Melissa Garcia comes in today for physical exam.  There has been no chest pain, shortness of breath, fever, chills, unexplained weight loss, rectal bleeding or any other unusual symptoms.  Had a baby 1 month ago, .  History of Hashimoto's but her TSH has never been abnormal so they are just watching.  No complaints or complications since.  She is breast-feeding recent labs, diagnostics and pertinent information has been reviewed. Patient is attempting to eat a healthy diet and incorporate exercise in to their lifestyle. Patient does not smoke. Alcohol in moderation. Patient is practicing routine eye and dental care. Reviewed employment status. No uncontrolled anxiety, depression. Reviewed current medications, if any.          Review of Systems   Constitutional:  Negative for fatigue, fever and unexpected weight change.   HENT:  Negative for congestion, ear pain, hearing loss, sore throat and trouble swallowing.    Eyes:  Negative for pain and visual disturbance.   Respiratory:  Negative for cough and shortness of breath.    Cardiovascular:  Negative for chest pain, palpitations and leg swelling.   Gastrointestinal:  Negative for abdominal pain, blood in stool, diarrhea, nausea and vomiting.   Genitourinary:  Negative for dysuria, frequency, hematuria and urgency.   Musculoskeletal:  Negative for joint swelling.   Skin:  Negative for pallor and rash.   Neurological:  Negative for dizziness, syncope, weakness, numbness and headaches.   Psychiatric/Behavioral:  Negative for confusion, decreased concentration, hallucinations and suicidal ideas.      Vitals:    24 1238   BP: 100/70   Pulse: 73   Temp: 36.3 °C (97.3 °F)   SpO2: 98%      Objective   Physical Exam  Constitutional:       Appearance: Normal appearance.   HENT:      Head: Normocephalic and atraumatic.      Right Ear: Tympanic membrane and external ear normal.      Left Ear:  Tympanic membrane and external ear normal.      Nose: Nose normal.      Mouth/Throat:      Mouth: Mucous membranes are moist.      Pharynx: Oropharynx is clear. No oropharyngeal exudate.   Eyes:      Extraocular Movements: Extraocular movements intact.      Conjunctiva/sclera: Conjunctivae normal.      Pupils: Pupils are equal, round, and reactive to light.   Neck:      Comments: Thyroid is thickened, rubbery, no masses, it is uniform  Cardiovascular:      Rate and Rhythm: Normal rate and regular rhythm.      Heart sounds: Normal heart sounds.   Pulmonary:      Effort: Pulmonary effort is normal.      Breath sounds: Normal breath sounds.   Abdominal:      General: Abdomen is flat.      Palpations: Abdomen is soft. There is no mass.      Tenderness: There is no abdominal tenderness. There is no guarding.   Musculoskeletal:      Cervical back: Neck supple.   Lymphadenopathy:      Cervical: No cervical adenopathy.   Skin:     General: Skin is warm and dry.   Neurological:      General: No focal deficit present.      Mental Status: She is alert.   Psychiatric:         Mood and Affect: Mood normal.         Speech: Speech normal.         Behavior: Behavior normal.         Cognition and Memory: Cognition normal.         Assessment/Plan   There are no diagnoses linked to this encounter.

## 2024-11-23 ENCOUNTER — LAB (OUTPATIENT)
Dept: LAB | Facility: LAB | Age: 29
End: 2024-11-23
Payer: COMMERCIAL

## 2024-11-23 DIAGNOSIS — Z00.00 ROUTINE GENERAL MEDICAL EXAMINATION AT A HEALTH CARE FACILITY: ICD-10-CM

## 2024-11-23 LAB
25(OH)D3 SERPL-MCNC: 41 NG/ML (ref 30–100)
ALBUMIN SERPL BCP-MCNC: 4 G/DL (ref 3.4–5)
ALP SERPL-CCNC: 110 U/L (ref 33–110)
ALT SERPL W P-5'-P-CCNC: 35 U/L (ref 7–45)
ANION GAP SERPL CALC-SCNC: 12 MMOL/L (ref 10–20)
AST SERPL W P-5'-P-CCNC: 25 U/L (ref 9–39)
BASOPHILS # BLD AUTO: 0.08 X10*3/UL (ref 0–0.1)
BASOPHILS NFR BLD AUTO: 0.9 %
BILIRUB SERPL-MCNC: 0.3 MG/DL (ref 0–1.2)
BUN SERPL-MCNC: 13 MG/DL (ref 6–23)
CALCIUM SERPL-MCNC: 9.5 MG/DL (ref 8.6–10.3)
CHLORIDE SERPL-SCNC: 104 MMOL/L (ref 98–107)
CHOLEST SERPL-MCNC: 190 MG/DL (ref 0–199)
CHOLESTEROL/HDL RATIO: 2.9
CO2 SERPL-SCNC: 27 MMOL/L (ref 21–32)
CREAT SERPL-MCNC: 0.76 MG/DL (ref 0.5–1.05)
EGFRCR SERPLBLD CKD-EPI 2021: >90 ML/MIN/1.73M*2
EOSINOPHIL # BLD AUTO: 0.29 X10*3/UL (ref 0–0.7)
EOSINOPHIL NFR BLD AUTO: 3.3 %
ERYTHROCYTE [DISTWIDTH] IN BLOOD BY AUTOMATED COUNT: 14.7 % (ref 11.5–14.5)
GLUCOSE SERPL-MCNC: 77 MG/DL (ref 74–99)
HCT VFR BLD AUTO: 41.9 % (ref 36–46)
HDLC SERPL-MCNC: 65.5 MG/DL
HGB BLD-MCNC: 12.9 G/DL (ref 12–16)
IMM GRANULOCYTES # BLD AUTO: 0.04 X10*3/UL (ref 0–0.7)
IMM GRANULOCYTES NFR BLD AUTO: 0.5 % (ref 0–0.9)
LDLC SERPL CALC-MCNC: 108 MG/DL
LYMPHOCYTES # BLD AUTO: 2.5 X10*3/UL (ref 1.2–4.8)
LYMPHOCYTES NFR BLD AUTO: 28.4 %
MCH RBC QN AUTO: 25.8 PG (ref 26–34)
MCHC RBC AUTO-ENTMCNC: 30.8 G/DL (ref 32–36)
MCV RBC AUTO: 84 FL (ref 80–100)
MONOCYTES # BLD AUTO: 0.56 X10*3/UL (ref 0.1–1)
MONOCYTES NFR BLD AUTO: 6.4 %
NEUTROPHILS # BLD AUTO: 5.33 X10*3/UL (ref 1.2–7.7)
NEUTROPHILS NFR BLD AUTO: 60.5 %
NON HDL CHOLESTEROL: 125 MG/DL (ref 0–149)
NRBC BLD-RTO: 0 /100 WBCS (ref 0–0)
PLATELET # BLD AUTO: 360 X10*3/UL (ref 150–450)
POTASSIUM SERPL-SCNC: 4.4 MMOL/L (ref 3.5–5.3)
PROT SERPL-MCNC: 6.9 G/DL (ref 6.4–8.2)
RBC # BLD AUTO: 5 X10*6/UL (ref 4–5.2)
SODIUM SERPL-SCNC: 139 MMOL/L (ref 136–145)
T3FREE SERPL-MCNC: 3.1 PG/ML (ref 2.3–4.2)
T4 FREE SERPL-MCNC: 0.71 NG/DL (ref 0.61–1.12)
TRIGL SERPL-MCNC: 83 MG/DL (ref 0–149)
VLDL: 17 MG/DL (ref 0–40)
WBC # BLD AUTO: 8.8 X10*3/UL (ref 4.4–11.3)

## 2024-11-23 PROCEDURE — 85025 COMPLETE CBC W/AUTO DIFF WBC: CPT

## 2024-11-23 PROCEDURE — 84439 ASSAY OF FREE THYROXINE: CPT

## 2024-11-23 PROCEDURE — 82306 VITAMIN D 25 HYDROXY: CPT

## 2024-11-23 PROCEDURE — 84481 FREE ASSAY (FT-3): CPT

## 2024-11-23 PROCEDURE — 36415 COLL VENOUS BLD VENIPUNCTURE: CPT

## 2024-11-23 PROCEDURE — 80061 LIPID PANEL: CPT

## 2024-11-23 PROCEDURE — 80053 COMPREHEN METABOLIC PANEL: CPT

## 2024-11-26 ENCOUNTER — LACTATION CONSULT (OUTPATIENT)
Dept: LACTATION | Facility: HOSPITAL | Age: 29
End: 2024-11-26
Payer: COMMERCIAL

## 2024-11-26 DIAGNOSIS — O92.70 LACTATION DISORDER (HHS-HCC): Primary | ICD-10-CM

## 2024-12-09 ENCOUNTER — APPOINTMENT (OUTPATIENT)
Dept: ALLERGY | Facility: CLINIC | Age: 29
End: 2024-12-09
Payer: COMMERCIAL

## 2024-12-09 DIAGNOSIS — J30.89 ALLERGIC RHINITIS DUE TO OTHER ALLERGIC TRIGGER, UNSPECIFIED SEASONALITY: ICD-10-CM

## 2024-12-09 PROCEDURE — 95117 IMMUNOTHERAPY INJECTIONS: CPT | Performed by: ALLERGY & IMMUNOLOGY

## 2024-12-19 PROCEDURE — 88175 CYTOPATH C/V AUTO FLUID REDO: CPT

## 2024-12-19 PROCEDURE — 88141 CYTOPATH C/V INTERPRET: CPT | Performed by: PATHOLOGY

## 2024-12-19 PROCEDURE — 87624 HPV HI-RISK TYP POOLED RSLT: CPT

## 2024-12-20 ENCOUNTER — LAB REQUISITION (OUTPATIENT)
Dept: LAB | Facility: HOSPITAL | Age: 29
End: 2024-12-20
Payer: COMMERCIAL

## 2024-12-20 DIAGNOSIS — Z11.51 ENCOUNTER FOR SCREENING FOR HUMAN PAPILLOMAVIRUS (HPV): ICD-10-CM

## 2024-12-23 ENCOUNTER — APPOINTMENT (OUTPATIENT)
Dept: ALLERGY | Facility: CLINIC | Age: 29
End: 2024-12-23
Payer: COMMERCIAL

## 2024-12-23 ENCOUNTER — LACTATION CONSULT (OUTPATIENT)
Dept: LACTATION | Facility: HOSPITAL | Age: 29
End: 2024-12-23
Payer: COMMERCIAL

## 2024-12-23 DIAGNOSIS — O92.70 LACTATION DISORDER (HHS-HCC): Primary | ICD-10-CM

## 2024-12-23 DIAGNOSIS — J30.89 ALLERGIC RHINITIS DUE TO OTHER ALLERGIC TRIGGER, UNSPECIFIED SEASONALITY: ICD-10-CM

## 2024-12-23 PROCEDURE — 95117 IMMUNOTHERAPY INJECTIONS: CPT | Performed by: ALLERGY & IMMUNOLOGY

## 2025-01-10 ENCOUNTER — APPOINTMENT (OUTPATIENT)
Dept: ALLERGY | Facility: CLINIC | Age: 30
End: 2025-01-10
Payer: COMMERCIAL

## 2025-01-10 DIAGNOSIS — J30.89 ALLERGIC RHINITIS DUE TO OTHER ALLERGIC TRIGGER, UNSPECIFIED SEASONALITY: ICD-10-CM

## 2025-01-10 PROCEDURE — 95117 IMMUNOTHERAPY INJECTIONS: CPT | Performed by: ALLERGY & IMMUNOLOGY

## 2025-01-13 ENCOUNTER — APPOINTMENT (OUTPATIENT)
Dept: ALLERGY | Facility: CLINIC | Age: 30
End: 2025-01-13
Payer: COMMERCIAL

## 2025-02-07 ENCOUNTER — APPOINTMENT (OUTPATIENT)
Dept: ALLERGY | Facility: CLINIC | Age: 30
End: 2025-02-07
Payer: COMMERCIAL

## 2025-02-10 ENCOUNTER — APPOINTMENT (OUTPATIENT)
Dept: ALLERGY | Facility: CLINIC | Age: 30
End: 2025-02-10
Payer: COMMERCIAL

## 2025-02-10 DIAGNOSIS — J30.89 ALLERGIC RHINITIS DUE TO OTHER ALLERGIC TRIGGER, UNSPECIFIED SEASONALITY: ICD-10-CM

## 2025-02-10 PROCEDURE — 95117 IMMUNOTHERAPY INJECTIONS: CPT | Performed by: ALLERGY & IMMUNOLOGY

## 2025-02-11 ENCOUNTER — APPOINTMENT (OUTPATIENT)
Dept: RADIOLOGY | Facility: HOSPITAL | Age: 30
End: 2025-02-11
Payer: COMMERCIAL

## 2025-02-26 ENCOUNTER — APPOINTMENT (OUTPATIENT)
Dept: RADIOLOGY | Facility: HOSPITAL | Age: 30
End: 2025-02-26
Payer: COMMERCIAL

## 2025-03-14 ENCOUNTER — APPOINTMENT (OUTPATIENT)
Dept: ALLERGY | Facility: CLINIC | Age: 30
End: 2025-03-14
Payer: COMMERCIAL

## 2025-03-21 ENCOUNTER — APPOINTMENT (OUTPATIENT)
Dept: ALLERGY | Facility: CLINIC | Age: 30
End: 2025-03-21
Payer: COMMERCIAL

## 2025-03-21 DIAGNOSIS — J30.89 ALLERGIC RHINITIS DUE TO OTHER ALLERGIC TRIGGER, UNSPECIFIED SEASONALITY: ICD-10-CM

## 2025-03-21 PROCEDURE — 95117 IMMUNOTHERAPY INJECTIONS: CPT | Performed by: ALLERGY & IMMUNOLOGY

## 2025-03-28 ENCOUNTER — LACTATION CONSULT (OUTPATIENT)
Dept: LACTATION | Facility: HOSPITAL | Age: 30
End: 2025-03-28
Payer: COMMERCIAL

## 2025-03-28 DIAGNOSIS — O92.70 LACTATION DISORDER (HHS-HCC): Primary | ICD-10-CM

## 2025-03-28 NOTE — PROGRESS NOTES
Melissa is a 30 year old patient here for lactation assistance, support and education. Melissa reports that infant has been feeding well, comfortably and often. Melissa has now returned to work and has concerns about pumping and her milk supply. Mallorie is now 5 months old and is taking a bottle well as well as nursing at the breast well. Mother has been pumping about every 2-3 hours while at work. Mallorie overall nursed decently today but was very distracted at the breast.     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 Mallorie has been sleeping through the night   Avg Voids/Stools in 24 hr period: 6-7 voids, stools fluctuate   ___________________________________________________________________________________  Pump type: Spectra S1 and MomCozy   Pumping frequency: every 2-3 hours while at work on  and    Pumping volume: 2-4.5 oz   Current Flange Size: 24mm  Pain with Pumping?   Denies  Skin Breakdown with Pumping? Denies    Began practice bottles? (Y/N) if so, at what age: Yes    ____________________________________________________________________________________  Time of Last Feedin  Pre-feeding Weight: 6400g  Active minutes on first breast: 10 minutes but popping on and off frequently to look around   Post-feed Wt first breast: 6435g  Milk transfer first breast: 35ml    Active minutes on second breast: 10 minutes  Post-feed Wt second breast: 6475g  Milk transfer second breast: 40ml  Total Milk Transfer: 75mls    Feeding Assessment-  Breast Assessment: Medium, filling  Nipple Assessment: Round, rudi  Latch (shallow, deep): Deep  Suck (stimulation needed, active, rhythmic, coordinated): Coordinated, rhythmic  Swallowing (none, a few, intermittent, frequent): intermittent   Quality (poor, fair, good, excellent): Fair     Nipple appearance after feeding: Round   Pumping volume after direct BF: 1 oz   Voids/Stools prior to/after feeding assess: void   ~~~~~~~~~~~~~~~~~~~~~~~~~~~~~~~~~~~~~~~~~  Next Pediatrician Appointment Scheduled for:   Recent or up-coming appts/consults: (Lactation, ENT, Peds Dentistry, Chiropractor, etc.): lactation as needed     Recommendation/Summary: Mother to continue feeding infant on demand. Reviewed pumping schedule and encouraged mother to continue with it. Reviewed appropriate volumes for infant to be taking while mother is away at work. Mother verbalizes confidence at the end of the consult. Offered ongoing assistance and support as needed and desired.

## 2025-04-01 DIAGNOSIS — R53.83 OTHER FATIGUE: Primary | ICD-10-CM

## 2025-04-18 ENCOUNTER — APPOINTMENT (OUTPATIENT)
Dept: ALLERGY | Facility: CLINIC | Age: 30
End: 2025-04-18
Payer: COMMERCIAL

## 2025-04-25 ENCOUNTER — CLINICAL SUPPORT (OUTPATIENT)
Dept: ALLERGY | Facility: CLINIC | Age: 30
End: 2025-04-25
Payer: COMMERCIAL

## 2025-04-25 DIAGNOSIS — J30.89 ALLERGIC RHINITIS DUE TO OTHER ALLERGIC TRIGGER, UNSPECIFIED SEASONALITY: ICD-10-CM

## 2025-04-25 PROCEDURE — 95115 IMMUNOTHERAPY ONE INJECTION: CPT | Performed by: ALLERGY & IMMUNOLOGY

## 2025-04-29 LAB
25(OH)D3+25(OH)D2 SERPL-MCNC: 42 NG/ML (ref 30–100)
BASOPHILS # BLD AUTO: 72 CELLS/UL (ref 0–200)
BASOPHILS NFR BLD AUTO: 0.8 %
EOSINOPHIL # BLD AUTO: 198 CELLS/UL (ref 15–500)
EOSINOPHIL NFR BLD AUTO: 2.2 %
ERYTHROCYTE [DISTWIDTH] IN BLOOD BY AUTOMATED COUNT: 13 % (ref 11–15)
HCT VFR BLD AUTO: 40.7 % (ref 35–45)
HGB BLD-MCNC: 13 G/DL (ref 11.7–15.5)
LYMPHOCYTES # BLD AUTO: 2826 CELLS/UL (ref 850–3900)
LYMPHOCYTES NFR BLD AUTO: 31.4 %
MCH RBC QN AUTO: 26.4 PG (ref 27–33)
MCHC RBC AUTO-ENTMCNC: 31.9 G/DL (ref 32–36)
MCV RBC AUTO: 82.7 FL (ref 80–100)
MONOCYTES # BLD AUTO: 675 CELLS/UL (ref 200–950)
MONOCYTES NFR BLD AUTO: 7.5 %
NEUTROPHILS # BLD AUTO: 5229 CELLS/UL (ref 1500–7800)
NEUTROPHILS NFR BLD AUTO: 58.1 %
PLATELET # BLD AUTO: 338 THOUSAND/UL (ref 140–400)
PMV BLD REES-ECKER: 9.5 FL (ref 7.5–12.5)
RBC # BLD AUTO: 4.92 MILLION/UL (ref 3.8–5.1)
TSH SERPL-ACNC: 1.21 MIU/L
WBC # BLD AUTO: 9 THOUSAND/UL (ref 3.8–10.8)

## 2025-05-01 ENCOUNTER — OFFICE VISIT (OUTPATIENT)
Dept: PRIMARY CARE | Facility: CLINIC | Age: 30
End: 2025-05-01
Payer: COMMERCIAL

## 2025-05-01 VITALS
SYSTOLIC BLOOD PRESSURE: 110 MMHG | HEIGHT: 66 IN | WEIGHT: 155 LBS | BODY MASS INDEX: 24.91 KG/M2 | OXYGEN SATURATION: 96 % | HEART RATE: 104 BPM | TEMPERATURE: 98.2 F | DIASTOLIC BLOOD PRESSURE: 80 MMHG

## 2025-05-01 DIAGNOSIS — F41.9 ANXIETY: Primary | ICD-10-CM

## 2025-05-01 PROCEDURE — 99213 OFFICE O/P EST LOW 20 MIN: CPT | Performed by: FAMILY MEDICINE

## 2025-05-01 PROCEDURE — 3008F BODY MASS INDEX DOCD: CPT | Performed by: FAMILY MEDICINE

## 2025-05-01 RX ORDER — SERTRALINE HYDROCHLORIDE 25 MG/1
25 TABLET, FILM COATED ORAL DAILY
Qty: 90 TABLET | Refills: 3 | Status: SHIPPED | OUTPATIENT
Start: 2025-05-01 | End: 2026-05-01

## 2025-05-01 ASSESSMENT — ENCOUNTER SYMPTOMS
LIGHT-HEADEDNESS: 0
JOINT SWELLING: 0
DYSURIA: 0
BRUISES/BLEEDS EASILY: 0
SHORTNESS OF BREATH: 0
ABDOMINAL PAIN: 0
FATIGUE: 1
DYSPHORIC MOOD: 0
TROUBLE SWALLOWING: 0
DIARRHEA: 0
VOMITING: 0
FEVER: 0
COUGH: 0
PALPITATIONS: 0
UNEXPECTED WEIGHT CHANGE: 0
HEMATURIA: 0
SINUS PAIN: 0
TREMORS: 0
BLOOD IN STOOL: 0
NAUSEA: 0
NERVOUS/ANXIOUS: 1
WEAKNESS: 0

## 2025-05-01 ASSESSMENT — PAIN SCALES - GENERAL: PAINLEVEL_OUTOF10: 0-NO PAIN

## 2025-05-01 NOTE — PATIENT INSTRUCTIONS
I do not have a good reason for the fatigue.  Except for that she has a new baby and is probably tired most of the time.  I highly recommend she start something for the anxiety as it seems to be interfering with her enjoyment of her new baby.  Discussed a small dose of Zoloft, 25 mg daily.  Reassured her that it is safe in breast-feeding.

## 2025-05-01 NOTE — PROGRESS NOTES
Subjective   Patient ID: Melissa Garcia is a 30 y.o. female.    Patient comes in today because she has been quite fatigued.  She has a 6-month-old baby and is breast-feeding.  She does not have weakness.  There are no focal neurological deficits.  Thyroid is normal.  She is on iron.  She has bad anxiety.  She has had it since childhood.  She has never been on medication.  Discussed therapy.          Review of Systems   Constitutional:  Positive for fatigue. Negative for fever and unexpected weight change.   HENT:  Negative for congestion, ear pain, nosebleeds, sinus pain and trouble swallowing.    Eyes:  Negative for visual disturbance.   Respiratory:  Negative for cough and shortness of breath.    Cardiovascular:  Negative for chest pain and palpitations.   Gastrointestinal:  Negative for abdominal pain, blood in stool, diarrhea, nausea and vomiting.   Genitourinary:  Negative for dysuria and hematuria.   Musculoskeletal:  Negative for gait problem and joint swelling.   Neurological:  Negative for tremors, weakness and light-headedness.   Hematological:  Does not bruise/bleed easily.   Psychiatric/Behavioral:  Negative for dysphoric mood and suicidal ideas. The patient is nervous/anxious.      Vitals:    05/01/25 1507   BP: 110/80   Pulse: 104   Temp: 36.8 °C (98.2 °F)   SpO2: 96%      Body mass index is 25.02 kg/m².  Objective   Physical Exam  Constitutional:       Appearance: Normal appearance.   HENT:      Right Ear: Tympanic membrane normal.      Left Ear: Tympanic membrane normal.      Nose: No congestion or rhinorrhea.      Mouth/Throat:      Mouth: Mucous membranes are moist.      Pharynx: Oropharynx is clear.   Cardiovascular:      Rate and Rhythm: Normal rate and regular rhythm.      Heart sounds: Normal heart sounds.   Pulmonary:      Effort: Pulmonary effort is normal.      Breath sounds: Normal breath sounds.   Musculoskeletal:      Cervical back: Neck supple.      Right lower leg: No edema.       "Left lower leg: No edema.   Skin:     General: Skin is warm and dry.   Neurological:      General: No focal deficit present.      Mental Status: She is alert.   Psychiatric:         Mood and Affect: Mood normal.         Speech: Speech normal.         Behavior: Behavior normal.         Cognition and Memory: Cognition normal.         Last Labs:     CMP:   Lab Results   Component Value Date    CALCIUM 9.5 11/23/2024    CALCIUM 9.2 07/12/2023    CALCIUM 9.5 10/13/2022    PROT 6.9 11/23/2024    PROT 7.4 07/12/2023    PROT 7.0 10/13/2022    ALBUMIN 4.0 11/23/2024    ALBUMIN 4.3 07/12/2023    ALBUMIN 4.2 10/13/2022    AST 25 11/23/2024    AST 29 07/12/2023    AST 16 10/13/2022    ALKPHOS 110 11/23/2024    ALKPHOS 56 07/12/2023    ALKPHOS 55 10/13/2022    BILITOT 0.3 11/23/2024    BILITOT 0.6 07/12/2023    BILITOT 0.3 10/13/2022     CBC:   Lab Results   Component Value Date    WBC 9.0 04/29/2025    WBC 8.8 11/23/2024    WBC 15.7 (H) 10/17/2024    HGB 13.0 04/29/2025    HGB 12.9 11/23/2024    HGB 12.1 10/17/2024    HCT 40.7 04/29/2025    HCT 41.9 11/23/2024    HCT 37.2 10/17/2024    MCV 82.7 04/29/2025    MCV 84 11/23/2024    MCV 81 10/17/2024     04/29/2025     11/23/2024     10/17/2024     A1C:   Lab Results   Component Value Date    HGBA1C 4.9 04/08/2024    HGBA1C 5.2 07/12/2023     LIPID PANEL:   Lab Results   Component Value Date    CHOL 190 11/23/2024    CHOL 179 07/12/2023    CHOL 195 10/13/2022    TRIG 83 11/23/2024    TRIG 126 07/12/2023    TRIG 109 10/13/2022    HDL 65.5 11/23/2024    HDL 60.9 07/12/2023    HDL 58.6 10/13/2022    CHHDL 2.9 11/23/2024    CHHDL 2.9 07/12/2023    CHHDL 3.3 10/13/2022    LDLF 93 07/12/2023    LDLF 115 (H) 10/13/2022    VLDL 17 11/23/2024    VLDL 25 07/12/2023    VLDL 22 10/13/2022    NHDL 125 11/23/2024     TSH:   Lab Results   Component Value Date    TSH 1.21 04/29/2025    TSH 1.07 07/15/2024    TSH 0.94 02/21/2024     PSA:   No results found for: \"PSA\" "     Assessment/Plan   Diagnoses and all orders for this visit:  Anxiety  -     sertraline (Zoloft) 25 mg tablet; Take 1 tablet (25 mg) by mouth once daily.

## 2025-05-16 ENCOUNTER — APPOINTMENT (OUTPATIENT)
Dept: ALLERGY | Facility: CLINIC | Age: 30
End: 2025-05-16
Payer: COMMERCIAL

## 2025-05-23 ENCOUNTER — APPOINTMENT (OUTPATIENT)
Dept: ALLERGY | Facility: CLINIC | Age: 30
End: 2025-05-23
Payer: COMMERCIAL

## 2025-05-23 DIAGNOSIS — J30.89 ALLERGIC RHINITIS DUE TO OTHER ALLERGIC TRIGGER, UNSPECIFIED SEASONALITY: ICD-10-CM

## 2025-05-23 PROCEDURE — 95117 IMMUNOTHERAPY INJECTIONS: CPT | Performed by: ALLERGY & IMMUNOLOGY

## 2025-06-18 NOTE — PROGRESS NOTES
Subjective   Patient ID: Melissa Brady is a 29 y.o. female.    Patient is new to me.  She is 10 weeks pregnant.  They are following her for hypo or hyperthyroidism as she has had a little bit of a goiter.  She has had shoulder surgery but in general she is very healthy.  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  She eats a good diet, stays active and thinks she may have a bit of irritable bowel as it is trouble some with certain foods.  No blood in the stool or chronic nausea or vomiting. She is healthy otherwise.  She is having full labs with the obstetrician next week.  Family history reviewed.        Review of Systems   Constitutional:  Negative for fatigue, fever and unexpected weight change.   HENT:  Negative for congestion, ear pain, hearing loss, sore throat and trouble swallowing.    Eyes:  Negative for pain and visual disturbance.   Respiratory:  Negative for cough and shortness of breath.    Cardiovascular:  Negative for chest pain, palpitations and leg swelling.   Gastrointestinal:  Negative for abdominal pain, blood in stool, diarrhea, nausea and vomiting.   Genitourinary:  Negative for dysuria, frequency, hematuria and urgency.   Musculoskeletal:  Negative for joint swelling.   Skin:  Negative for pallor and rash.   Neurological:  Negative for dizziness, syncope, weakness, numbness and headaches.   Psychiatric/Behavioral:  Negative for confusion, decreased concentration, hallucinations and suicidal ideas.      Vitals:    04/01/24 0850   BP: 130/80   Pulse: 71   Temp: 36.5 °C (97.7 °F)   SpO2: 97%      Objective   Physical Exam  Constitutional:       Appearance: Normal appearance.   HENT:      Head: Normocephalic and atraumatic.      Right Ear: Tympanic membrane and external ear normal.      Left Ear: Tympanic membrane and external ear normal.      Nose: Nose normal.      Mouth/Throat:      Mouth: Mucous membranes are moist.      Pharynx: Oropharynx is clear. No oropharyngeal  exudate.   Eyes:      Extraocular Movements: Extraocular movements intact.      Conjunctiva/sclera: Conjunctivae normal.      Pupils: Pupils are equal, round, and reactive to light.   Cardiovascular:      Rate and Rhythm: Normal rate and regular rhythm.      Heart sounds: Normal heart sounds.   Pulmonary:      Effort: Pulmonary effort is normal.      Breath sounds: Normal breath sounds.   Abdominal:      General: Abdomen is flat.      Palpations: Abdomen is soft. There is no mass.      Tenderness: There is no abdominal tenderness. There is no guarding.   Musculoskeletal:      Cervical back: Neck supple.   Lymphadenopathy:      Cervical: No cervical adenopathy.   Skin:     General: Skin is warm and dry.   Neurological:      General: No focal deficit present.      Mental Status: She is alert.   Psychiatric:         Mood and Affect: Mood normal.         Speech: Speech normal.         Behavior: Behavior normal.         Cognition and Memory: Cognition normal.         Assessment/Plan   Diagnoses and all orders for this visit:  Routine general medical examination at a health care facility       Yes

## 2025-06-27 ENCOUNTER — APPOINTMENT (OUTPATIENT)
Dept: ALLERGY | Facility: CLINIC | Age: 30
End: 2025-06-27
Payer: COMMERCIAL

## 2025-08-14 ENCOUNTER — OFFICE VISIT (OUTPATIENT)
Dept: URGENT CARE | Age: 30
End: 2025-08-14
Payer: COMMERCIAL

## 2025-08-14 DIAGNOSIS — N61.0 ACUTE MASTITIS OF LEFT BREAST: Primary | ICD-10-CM

## 2025-08-14 RX ORDER — CEPHALEXIN 500 MG/1
500 CAPSULE ORAL 3 TIMES DAILY
Qty: 21 CAPSULE | Refills: 0 | Status: SHIPPED | OUTPATIENT
Start: 2025-08-14 | End: 2025-08-21

## 2025-08-15 ENCOUNTER — APPOINTMENT (OUTPATIENT)
Dept: ALLERGY | Facility: CLINIC | Age: 30
End: 2025-08-15
Payer: COMMERCIAL

## 2025-08-22 ENCOUNTER — CLINICAL SUPPORT (OUTPATIENT)
Dept: ALLERGY | Facility: CLINIC | Age: 30
End: 2025-08-22
Payer: COMMERCIAL

## 2025-08-22 DIAGNOSIS — J30.89 ALLERGIC RHINITIS DUE TO OTHER ALLERGIC TRIGGER, UNSPECIFIED SEASONALITY: ICD-10-CM

## 2025-08-22 PROCEDURE — 95117 IMMUNOTHERAPY INJECTIONS: CPT | Performed by: ALLERGY & IMMUNOLOGY

## 2025-08-29 ENCOUNTER — APPOINTMENT (OUTPATIENT)
Dept: ALLERGY | Facility: CLINIC | Age: 30
End: 2025-08-29
Payer: COMMERCIAL

## 2025-08-29 DIAGNOSIS — J30.89 ALLERGIC RHINITIS DUE TO OTHER ALLERGIC TRIGGER, UNSPECIFIED SEASONALITY: ICD-10-CM

## 2025-08-29 PROCEDURE — 95117 IMMUNOTHERAPY INJECTIONS: CPT | Performed by: ALLERGY & IMMUNOLOGY

## 2025-09-05 ENCOUNTER — APPOINTMENT (OUTPATIENT)
Dept: ALLERGY | Facility: CLINIC | Age: 30
End: 2025-09-05
Payer: COMMERCIAL

## 2025-09-12 ENCOUNTER — APPOINTMENT (OUTPATIENT)
Dept: ALLERGY | Facility: CLINIC | Age: 30
End: 2025-09-12
Payer: COMMERCIAL

## 2025-09-26 ENCOUNTER — APPOINTMENT (OUTPATIENT)
Dept: ALLERGY | Facility: CLINIC | Age: 30
End: 2025-09-26
Payer: COMMERCIAL

## 2025-10-24 ENCOUNTER — APPOINTMENT (OUTPATIENT)
Dept: ALLERGY | Facility: CLINIC | Age: 30
End: 2025-10-24
Payer: COMMERCIAL

## 2025-10-30 ENCOUNTER — APPOINTMENT (OUTPATIENT)
Dept: PRIMARY CARE | Facility: CLINIC | Age: 30
End: 2025-10-30
Payer: COMMERCIAL